# Patient Record
Sex: FEMALE | Race: WHITE | NOT HISPANIC OR LATINO | Employment: OTHER | URBAN - NONMETROPOLITAN AREA
[De-identification: names, ages, dates, MRNs, and addresses within clinical notes are randomized per-mention and may not be internally consistent; named-entity substitution may affect disease eponyms.]

---

## 2017-01-04 ENCOUNTER — TELEPHONE (OUTPATIENT)
Dept: ORTHOPEDIC SURGERY | Facility: CLINIC | Age: 52
End: 2017-01-04

## 2017-01-04 ENCOUNTER — APPOINTMENT (OUTPATIENT)
Dept: PREADMISSION TESTING | Facility: HOSPITAL | Age: 52
End: 2017-01-04

## 2017-01-04 LAB
ANION GAP SERPL CALCULATED.3IONS-SCNC: 5.6 MMOL/L (ref 3.6–11.2)
BASOPHILS # BLD AUTO: 0.03 10*3/MM3 (ref 0–0.3)
BASOPHILS NFR BLD AUTO: 0.4 % (ref 0–2)
BUN BLD-MCNC: 10 MG/DL (ref 7–21)
BUN/CREAT SERPL: 11.5 (ref 7–25)
CALCIUM SPEC-SCNC: 10.1 MG/DL (ref 7.7–10)
CHLORIDE SERPL-SCNC: 101 MMOL/L (ref 99–112)
CO2 SERPL-SCNC: 30.4 MMOL/L (ref 24.3–31.9)
CREAT BLD-MCNC: 0.87 MG/DL (ref 0.43–1.29)
DEPRECATED RDW RBC AUTO: 48.7 FL (ref 37–54)
EOSINOPHIL # BLD AUTO: 0.14 10*3/MM3 (ref 0–0.7)
EOSINOPHIL NFR BLD AUTO: 1.9 % (ref 0–5)
ERYTHROCYTE [DISTWIDTH] IN BLOOD BY AUTOMATED COUNT: 15.4 % (ref 11.5–14.5)
GFR SERPL CREATININE-BSD FRML MDRD: 68 ML/MIN/1.73
GLUCOSE BLD-MCNC: 143 MG/DL (ref 70–110)
HCT VFR BLD AUTO: 47.6 % (ref 37–47)
HGB BLD-MCNC: 15.8 G/DL (ref 12–16)
IMM GRANULOCYTES # BLD: 0.01 10*3/MM3 (ref 0–0.03)
IMM GRANULOCYTES NFR BLD: 0.1 % (ref 0–0.5)
LYMPHOCYTES # BLD AUTO: 2.24 10*3/MM3 (ref 1–3)
LYMPHOCYTES NFR BLD AUTO: 31.1 % (ref 21–51)
MCH RBC QN AUTO: 28.5 PG (ref 27–33)
MCHC RBC AUTO-ENTMCNC: 33.2 G/DL (ref 33–37)
MCV RBC AUTO: 85.9 FL (ref 80–94)
MONOCYTES # BLD AUTO: 0.93 10*3/MM3 (ref 0.1–0.9)
MONOCYTES NFR BLD AUTO: 12.9 % (ref 0–10)
NEUTROPHILS # BLD AUTO: 3.86 10*3/MM3 (ref 1.4–6.5)
NEUTROPHILS NFR BLD AUTO: 53.6 % (ref 30–70)
OSMOLALITY SERPL CALC.SUM OF ELEC: 275.3 MOSM/KG (ref 273–305)
PLATELET # BLD AUTO: 278 10*3/MM3 (ref 130–400)
PMV BLD AUTO: 11.3 FL (ref 6–10)
POTASSIUM BLD-SCNC: 4.4 MMOL/L (ref 3.5–5.3)
RBC # BLD AUTO: 5.54 10*6/MM3 (ref 4.2–5.4)
SODIUM BLD-SCNC: 137 MMOL/L (ref 135–153)
WBC NRBC COR # BLD: 7.21 10*3/MM3 (ref 4.5–12.5)

## 2017-01-04 PROCEDURE — 80048 BASIC METABOLIC PNL TOTAL CA: CPT | Performed by: ORTHOPAEDIC SURGERY

## 2017-01-04 PROCEDURE — 36415 COLL VENOUS BLD VENIPUNCTURE: CPT

## 2017-01-04 PROCEDURE — 85025 COMPLETE CBC W/AUTO DIFF WBC: CPT | Performed by: ORTHOPAEDIC SURGERY

## 2017-01-04 RX ORDER — AMOXICILLIN AND CLAVULANATE POTASSIUM 875; 125 MG/1; MG/1
1 TABLET, FILM COATED ORAL 2 TIMES DAILY
COMMUNITY
End: 2017-11-22

## 2017-01-04 RX ORDER — MAGNESIUM GLUCONATE 27 MG(500)
500 TABLET ORAL 2 TIMES DAILY
COMMUNITY
End: 2019-04-10

## 2017-01-04 RX ORDER — CETIRIZINE HYDROCHLORIDE 10 MG/1
10 TABLET ORAL NIGHTLY
COMMUNITY

## 2017-01-04 NOTE — DISCHARGE INSTRUCTIONS
0800   1/5/17  TAKE the following medications the morning of surgery:  All heart or blood pressure medications    HOLD all diabetic medications the morning of surgery as ordered by physician.      General Instructions:  · Do not eat or drink after midnight: includes water, mints, or gum. You may brush your teeth.  · Do not smoke, chew tobacco, or drink alcohol.  · Bring medications in original bottles, any inhalers and if applicable your C-PAP/BI-PAP machine.  · Bring any papers given to you in the doctor's office.  · Wear clean comfortable clothes and socks.  · Do not wear contact lenses or make-up. Bring a case for your glasses if applicable.  · Bring crutches or walker if applicable.  · Leave all other valuables and jewelry at home.    If you were given a blood bank ID arm band remember to bring it with you the day of surgery.    Preventing a Surgical Site Infection:  Shower on the morning of surgery using a fresh bar of anti-bacterial soap (such as Dial) and clean washcloth. Dry with a clean towel and dress in clean clothing.  For 2 to 3 days before surgery, avoid shaving with a razor near where you will have surgery because the razor can irritate skin and make it easier to develop an infection. Ask your surgeon if you will be receiving antibiotics prior to surgery.  Make sure you, your family, and all healthcare providers clear their hands with soap and water or an alcohol-based hand  before caring for you or your wound.  If at all possible, quit smoking as many days before surgery as you can.    Day of surgery:  Upon arrival, a Pre-op nurse and Anesthesiologist will review your health history, obtain vital signs, and answer questions you may have. The only belongings needed at this time will be your home medications and if applicable your C-PAP/BI-PAP machine. If you are staying overnight your family can leave the rest of your belongings in the car and bring them to your room later. A Pre-op nurse  will start an IV and you may receive medication in preparation for surgery, including something to help you relax. Your family will be able to see you in the Pre-op area. While you are in surgery your family should notify the waiting room  if they leave the waiting room area and provide a contact phone number.    Please be aware that surgery does come with discomfort. We want to make every effort to control your discomfort so please discuss any uncontrolled symptoms with your nurse. Your doctor will most likely have prescribed pain medications.    If you are going home after surgery you will receive individualized written care instructions before being discharged. A responsible adult must drive you to and from the hospital on the day of surgery and stay with you for 24 hours.    If you are staying overnight following surgery, you will be transported to your hospital room following the recovery period.  [facility] has all private rooms.    If you have any questions please call Pre-Admission Testing at [phone].  Deductibles and co-payments are collected on the day of service. Please be prepared to pay the required co-pay, deductible or deposit on the day of service as defined by your plan.

## 2017-01-04 NOTE — MR AVS SNAPSHOT
Debbieida Strauss   1/4/2017 12:45 PM   Appointment    Provider:  PAT 3 [facility]   Department:  [facility]   Dept Phone:  [phone]                Your Full Care Plan              Your Updated Medication List          This list is accurate as of: 1/4/17  1:07 PM.  Always use your most recent med list.                albuterol 108 (90 BASE) MCG/ACT inhaler   Commonly known as:  PROVENTIL HFA;VENTOLIN HFA       amitriptyline 10 MG tablet   Commonly known as:  ELAVIL       amoxicillin-clavulanate 875-125 MG per tablet   Commonly known as:  AUGMENTIN       aspirin 81 MG tablet       atorvastatin 20 MG tablet   Commonly known as:  LIPITOR       baclofen 20 MG tablet   Commonly known as:  LIORESAL       cetirizine 10 MG tablet   Commonly known as:  zyrTEC       clotrimazole-betamethasone 1-0.05 % cream   Commonly known as:  LOTRISONE       lisinopril 5 MG tablet   Commonly known as:  PRINIVIL,ZESTRIL       magnesium gluconate 500 MG tablet   Commonly known as:  MAGONATE       metFORMIN 850 MG tablet   Commonly known as:  GLUCOPHAGE       metoprolol tartrate 25 MG tablet   Commonly known as:  LOPRESSOR       montelukast 10 MG tablet   Commonly known as:  SINGULAIR       nabumetone 750 MG tablet   Commonly known as:  RELAFEN       SITagliptin 50 MG tablet   Commonly known as:  JANUVIA       vitamin D 94153 UNITS capsule capsule   Commonly known as:  ERGOCALCIFEROL               MyChart Signup     Our records indicate that your [facility] [email or web address] account has been deactivated. If you would like to reactivate your account, please email [email or web address] or call [phone] to talk to our [email or web address] staff.             Other Info from Your Visit           Allergies     Corticosteroids       Vital Signs     Smoking Status                   Current Every Day Smoker             Discharge Instructions       0800   1/5/17  TAKE the following medications the morning  of surgery:  All heart or blood pressure medications    HOLD all diabetic medications the morning of surgery as ordered by physician.      General Instructions:  · Do not eat or drink after midnight: includes water, mints, or gum. You may brush your teeth.  · Do not smoke, chew tobacco, or drink alcohol.  · Bring medications in original bottles, any inhalers and if applicable your C-PAP/BI-PAP machine.  · Bring any papers given to you in the doctor's office.  · Wear clean comfortable clothes and socks.  · Do not wear contact lenses or make-up. Bring a case for your glasses if applicable.  · Bring crutches or walker if applicable.  · Leave all other valuables and jewelry at home.    If you were given a blood bank ID arm band remember to bring it with you the day of surgery.    Preventing a Surgical Site Infection:  Shower on the morning of surgery using a fresh bar of anti-bacterial soap (such as Dial) and clean washcloth. Dry with a clean towel and dress in clean clothing.  For 2 to 3 days before surgery, avoid shaving with a razor near where you will have surgery because the razor can irritate skin and make it easier to develop an infection. Ask your surgeon if you will be receiving antibiotics prior to surgery.  Make sure you, your family, and all healthcare providers clear their hands with soap and water or an alcohol-based hand  before caring for you or your wound.  If at all possible, quit smoking as many days before surgery as you can.    Day of surgery:  Upon arrival, a Pre-op nurse and Anesthesiologist will review your health history, obtain vital signs, and answer questions you may have. The only belongings needed at this time will be your home medications and if applicable your C-PAP/BI-PAP machine. If you are staying overnight your family can leave the rest of your belongings in the car and bring them to your room later. A Pre-op nurse will start an IV and you may receive medication in  preparation for surgery, including something to help you relax. Your family will be able to see you in the Pre-op area. While you are in surgery your family should notify the waiting room  if they leave the waiting room area and provide a contact phone number.    Please be aware that surgery does come with discomfort. We want to make every effort to control your discomfort so please discuss any uncontrolled symptoms with your nurse. Your doctor will most likely have prescribed pain medications.    If you are going home after surgery you will receive individualized written care instructions before being discharged. A responsible adult must drive you to and from the hospital on the day of surgery and stay with you for 24 hours.    If you are staying overnight following surgery, you will be transported to your hospital room following the recovery period.  [facility] has all private rooms.    If you have any questions please call Pre-Admission Testing at [phone].  Deductibles and co-payments are collected on the day of service. Please be prepared to pay the required co-pay, deductible or deposit on the day of service as defined by your plan.           SYMPTOMS OF A STROKE    Call 911 or have someone take you to the Emergency Department if you have any of the following:    · Sudden numbness or weakness of your face, arm or leg especially on one side of the body  · Sudden confusion, diffiiculty speaking or trouble understanding   · Changes in your vision or loss of sight in one eye  · Sudden severe headache with no known cause  · sudden dizziness, trouble walking, loss of balance or coordination    It is important to seek emergency care right away if you have further stroke symptoms. If you get emergency help quickly, the powerful clot-dissolving medicines can reduce the disabilities caused by a stroke.     For more information:    American Stroke  Association  6-288-5-STROKE  www.strokeassociation.org           IF YOU SMOKE OR USE TOBACCO PLEASE READ THE FOLLOWING:    Why is smoking bad for me?  Smoking increases the risk of heart disease, lung disease, vascular disease, stroke, and cancer.     If you smoke, STOP!    If you would like more information on quitting smoking, please visit the [employer] website: [email or web address]   This link will provide additional resources including the QUIT line and the Beat the Pack support groups.     For more information:    American Cancer Society  ([phone] 242-8470    American Heart Association  1-476.124.3903

## 2017-01-05 ENCOUNTER — ANESTHESIA EVENT (OUTPATIENT)
Dept: PERIOP | Facility: HOSPITAL | Age: 52
End: 2017-01-05

## 2017-01-05 ENCOUNTER — ANESTHESIA (OUTPATIENT)
Dept: PERIOP | Facility: HOSPITAL | Age: 52
End: 2017-01-05

## 2017-01-05 PROCEDURE — 25010000002 PROPOFOL 1000 MG/ML EMULSION: Performed by: NURSE ANESTHETIST, CERTIFIED REGISTERED

## 2017-01-05 PROCEDURE — 25010000002 MIDAZOLAM PER 1 MG: Performed by: NURSE ANESTHETIST, CERTIFIED REGISTERED

## 2017-01-05 PROCEDURE — 25010000002 FENTANYL CITRATE (PF) 100 MCG/2ML SOLUTION: Performed by: NURSE ANESTHETIST, CERTIFIED REGISTERED

## 2017-01-05 RX ORDER — LIDOCAINE HYDROCHLORIDE 20 MG/ML
INJECTION, SOLUTION INFILTRATION; PERINEURAL AS NEEDED
Status: DISCONTINUED | OUTPATIENT
Start: 2017-01-05 | End: 2017-01-05 | Stop reason: SURG

## 2017-01-05 RX ORDER — FAMOTIDINE 10 MG/ML
INJECTION, SOLUTION INTRAVENOUS AS NEEDED
Status: DISCONTINUED | OUTPATIENT
Start: 2017-01-05 | End: 2017-01-05 | Stop reason: SURG

## 2017-01-05 RX ORDER — FENTANYL CITRATE 50 UG/ML
INJECTION, SOLUTION INTRAMUSCULAR; INTRAVENOUS AS NEEDED
Status: DISCONTINUED | OUTPATIENT
Start: 2017-01-05 | End: 2017-01-05 | Stop reason: SURG

## 2017-01-05 RX ORDER — MIDAZOLAM HYDROCHLORIDE 1 MG/ML
INJECTION INTRAMUSCULAR; INTRAVENOUS AS NEEDED
Status: DISCONTINUED | OUTPATIENT
Start: 2017-01-05 | End: 2017-01-05 | Stop reason: SURG

## 2017-01-05 RX ADMIN — SODIUM CHLORIDE, POTASSIUM CHLORIDE, SODIUM LACTATE AND CALCIUM CHLORIDE: 600; 310; 30; 20 INJECTION, SOLUTION INTRAVENOUS at 10:12

## 2017-01-05 RX ADMIN — FAMOTIDINE 20 MG: 10 INJECTION, SOLUTION INTRAVENOUS at 09:14

## 2017-01-05 RX ADMIN — FENTANYL CITRATE 100 MCG: 50 INJECTION INTRAMUSCULAR; INTRAVENOUS at 09:14

## 2017-01-05 RX ADMIN — SODIUM CHLORIDE, POTASSIUM CHLORIDE, SODIUM LACTATE AND CALCIUM CHLORIDE: 600; 310; 30; 20 INJECTION, SOLUTION INTRAVENOUS at 09:14

## 2017-01-05 RX ADMIN — LIDOCAINE HYDROCHLORIDE 40 MG: 20 INJECTION, SOLUTION INFILTRATION; PERINEURAL at 09:15

## 2017-01-05 RX ADMIN — MIDAZOLAM HYDROCHLORIDE 2 MG: 1 INJECTION, SOLUTION INTRAMUSCULAR; INTRAVENOUS at 09:14

## 2017-01-05 RX ADMIN — PROPOFOL 100 MCG/KG/MIN: 10 INJECTION, EMULSION INTRAVENOUS at 09:16

## 2017-01-05 NOTE — ANESTHESIA POSTPROCEDURE EVALUATION
Patient: Debbie Strauss    Procedure Summary     Date Anesthesia Start Anesthesia Stop Room / Location    01/05/17 0914 1025  [facility] OR 03 / [facility] OR       Procedure Diagnosis Surgeon Provider    ULNAR NERVE RELEASE, CARPAL TUNNEL RELEASE (Left Arm Lower) Carpal tunnel syndrome of left wrist; Ulnar neuropathy at elbow of left upper extremity  (Carpal tunnel syndrome of left wrist [G56.02]; Ulnar neuropathy at elbow of left upper extremity [G56.22]) MD Bartolo Gill MD          Anesthesia Type: general  Last vitals  /68 (01/05/17 1037)    Temp 97.6 °F (36.4 °C) (01/05/17 1022)    Pulse 72 (01/05/17 1037)   Resp 14 (01/05/17 1037)    SpO2 98 % (01/05/17 1037)      Post Anesthesia Care and Evaluation    Patient location during evaluation: PHASE II  Patient participation: complete - patient participated  Level of consciousness: awake and alert  Pain score: 1  Pain management: adequate  Airway patency: patent  Anesthetic complications: No PONV, no malignant hyperthermia, no difficult airway, no acute intermittent porphyria, no failed pre-medication, and no pseudocholinesterase deficiency.  Cardiovascular status: acceptable  Respiratory status: acceptable  Hydration status: acceptable

## 2017-01-05 NOTE — ANESTHESIA PREPROCEDURE EVALUATION
Anesthesia Evaluation     Patient summary reviewed and Nursing notes reviewed    History of anesthetic complications (trouble waking up)   Airway   Mallampati: II  TM distance: >3 FB  Neck ROM: full  no difficulty expected  Dental - normal exam     Pulmonary - normal exam   (+) hx of smoking, COPD, asthma,   Cardiovascular - normal exam  Exercise tolerance: good (4-7 METS)  (+) hypertension, CAD,   (-) past MI, dysrhythmias, angina, CHF    NYHA Classification: II  Patient on routine beta blocker and Beta blocker given within 24 hours of surgery    Neuro/Psych  (+) CVA (left sided weakness) residual symptoms, headaches, numbness, psychiatric history,    (-) seizures  GI/Hepatic/Renal/Endo    (+)  GERD, diabetes mellitus,   (-) hypothyroidism    Musculoskeletal     (+) myalgias,   Abdominal  - normal exam    Bowel sounds: normal.   Substance History - negative use     OB/GYN negative ob/gyn ROS         Other   (+) arthritis                          Anesthesia Plan    ASA 3     general     intravenous induction   Anesthetic plan and risks discussed with patient.  Use of blood products discussed with patient  Consented to blood products.

## 2017-01-06 ENCOUNTER — TELEPHONE (OUTPATIENT)
Dept: ORTHOPEDIC SURGERY | Facility: CLINIC | Age: 52
End: 2017-01-06

## 2017-01-06 NOTE — TELEPHONE ENCOUNTER
Patient call with some questions concerning her surgery CTR and Ulnar nerve release 01/05/17.   Can she take a shower, when can the dressing be removed and does she need to be in the sling.   Patient was instructed the surgery site can not get wet and the dressing needs to stay on until her post op visit and she is to stay in the sling. Patient stated she understood.

## 2017-01-16 ENCOUNTER — OFFICE VISIT (OUTPATIENT)
Dept: ORTHOPEDIC SURGERY | Facility: CLINIC | Age: 52
End: 2017-01-16

## 2017-01-16 DIAGNOSIS — G56.22 ULNAR NEUROPATHY AT ELBOW OF LEFT UPPER EXTREMITY: ICD-10-CM

## 2017-01-16 DIAGNOSIS — Z98.890 S/P CARPAL TUNNEL RELEASE: ICD-10-CM

## 2017-01-16 DIAGNOSIS — G56.02 CARPAL TUNNEL SYNDROME OF LEFT WRIST: Primary | ICD-10-CM

## 2017-01-16 PROCEDURE — 99024 POSTOP FOLLOW-UP VISIT: CPT | Performed by: ORTHOPAEDIC SURGERY

## 2017-01-16 NOTE — PROGRESS NOTES
Debbie Strauss   1965 01/16/2017  Date of Surgery: January 5,2017.    HPI: Follow-up status post left Carpal Tunnel release, Cubital Tunnel Release. . Patient returns to clinic today now 11 days out from surgery.  Doing well with wrist, elbow pain controlled. Mild soreness and numbness of the left 4th and 5th digits. Denies any other complications or paraesthesias.      Physical Exam: 51 y.o. female .  General Appearance:    Well appearing, cooperative, in no acute distress.       Patient is alert and oriented x 3.     There were no vitals filed for this visit.    left wrist,elbow incisions clean, dry, intact, healing well. no erythema, ecchymosis or significant effusion   Flexs and extends wrist and elbow with no complication.     Brisk capillary refill.   No tenderness upon palpation   Neurovascular status grossly intact               Impression       ICD-10-CM ICD-9-CM   1. Carpal tunnel syndrome of left wrist G56.02 354.0   2. Ulnar neuropathy at elbow of left upper extremity G56.22 354.2   3. S/P carpal tunnel release Z98.890 V45.89      Status post  left Carpal Tunnel release, Cubital Tunnel Release.     11 days post op.     Plan   1.Progress activity with the exception of heavy lifting over the next 6 weeks. Discontinue sling and splint.   2. Removal of Sutures left hand, staples left elbow.   3. Return to clinic in  6 weeks    Discussion/Summary:    Written by Kvng Segal PA-C, acting as scribe for Dr. Tiarra GREGORIO, Dr. Mayen personally performed the services described in this documentation, as scribed by Kvng Segal PA-C, in my presence, and is both accurate and complete.    This document was signed by Kvng Segal PA-C January 16, 2017 1:16 PM

## 2017-01-16 NOTE — MR AVS SNAPSHOT
Debbie Strauss   1/16/2017 2:30 PM   Office Visit    Dept Phone:  [phone]   Encounter #:  [id number]    Provider:  Wilmer Mayen MD   Department:  [facility]                Your Full Care Plan              Your Updated Medication List          This list is accurate as of: 1/16/17  1:38 PM.  Always use your most recent med list.                albuterol 108 (90 BASE) MCG/ACT inhaler   Commonly known as:  PROVENTIL HFA;VENTOLIN HFA       amitriptyline 10 MG tablet   Commonly known as:  ELAVIL       amoxicillin-clavulanate 875-125 MG per tablet   Commonly known as:  AUGMENTIN       aspirin 81 MG tablet       atorvastatin 20 MG tablet   Commonly known as:  LIPITOR       baclofen 20 MG tablet   Commonly known as:  LIORESAL       cetirizine 10 MG tablet   Commonly known as:  zyrTEC       clotrimazole-betamethasone 1-0.05 % cream   Commonly known as:  LOTRISONE       lisinopril 5 MG tablet   Commonly known as:  PRINIVIL,ZESTRIL       magnesium gluconate 500 MG tablet   Commonly known as:  MAGONATE       metFORMIN 850 MG tablet   Commonly known as:  GLUCOPHAGE       metoprolol tartrate 25 MG tablet   Commonly known as:  LOPRESSOR       montelukast 10 MG tablet   Commonly known as:  SINGULAIR       nabumetone 750 MG tablet   Commonly known as:  RELAFEN       oxyCODONE-acetaminophen 5-325 MG per tablet   Commonly known as:  PERCOCET   Take 1-2 tablets by mouth Every 4 (Four) Hours As Needed (Pain).       SITagliptin 50 MG tablet   Commonly known as:  JANUVIA       vitamin D 31296 UNITS capsule capsule   Commonly known as:  ERGOCALCIFEROL               You Were Diagnosed With        Codes Comments    Carpal tunnel syndrome of left wrist    -  Primary ICD-10-CM: G56.02  ICD-9-CM: 354.0     Ulnar neuropathy at elbow of left upper extremity     ICD-10-CM: G56.22  ICD-9-CM: 354.2     S/P carpal tunnel release     ICD-10-CM: Z98.890  ICD-9-CM: V45.89          Instructions     None    Patient Instructions History      Upcoming Appointments     Visit Type Date Time Department    POST-OP 1/16/2017  2:30 PM [facility]    FOLLOW UP 2/27/2017 10:10 AM [facility]      MyChart Signup     Our records indicate that you have declined [facility] MyChart signup. If you would like to sign up for MyChart, please email [email or web address] or call [phone] to obtain an activation code.             Other Info from Your Visit           Your Appointments     Jan 16, 2017  2:30 PM EST   Post-Op with Wilmer Mayen MD   [facility] (--)    [address]  [place] [address]   [phone]            Feb 27, 2017 10:10 AM EST   Follow Up with Wilmer Mayen MD   [facility] (--)    [address]  [place] [address]   [phone]           Arrive 15 minutes prior to appointment.              Allergies     Corticosteroids      Prednisone Intolerance Other (See Comments)      Reason for Visit     Left Hand - Post-op     Joint Swelling           Vital Signs     Smoking Status                   Current Every Day Smoker           Problems and Diagnoses Noted     Carpal tunnel syndrome on left    Ulnar neuropathy at elbow of left upper extremity    Status post carpal tunnel release

## 2017-02-27 ENCOUNTER — OFFICE VISIT (OUTPATIENT)
Dept: ORTHOPEDIC SURGERY | Facility: CLINIC | Age: 52
End: 2017-02-27

## 2017-02-27 VITALS — BODY MASS INDEX: 41.46 KG/M2 | HEIGHT: 63 IN | WEIGHT: 234 LBS

## 2017-02-27 DIAGNOSIS — G56.22 ULNAR NEUROPATHY AT ELBOW OF LEFT UPPER EXTREMITY: ICD-10-CM

## 2017-02-27 DIAGNOSIS — G56.02 CARPAL TUNNEL SYNDROME OF LEFT WRIST: Primary | ICD-10-CM

## 2017-02-27 PROCEDURE — 99024 POSTOP FOLLOW-UP VISIT: CPT | Performed by: ORTHOPAEDIC SURGERY

## 2017-02-27 NOTE — PROGRESS NOTES
Debbie Strauss   :1965    Date of encounter:2017        HPI:  Debbie Strauss is a 51 y.o. seen here today status post carpal tunnel release and ulnar nerve release of the left arm.  She is now approximately 7 weeks postop.  She states she's been doing well and only has some mild pain along the incision in her hand.  She also has some mild numbness just on the tip of the third and fifth fingers.      Exam:  On examination both incisions are healed nicely.  She has normal motion.  She has improved sensation with just mild numbness along the third and fifth finger.      Assessment  No diagnosis found.    Plan:  A 51-year-old female 7 weeks status post carpal tunnel release of the left wrist and ulnar nerve release of the left elbow.  She's doing well with improvement in her paresthesias.  We've advised she can slowly ease back into activities as tolerated and will return back here on an as-needed basis with any further difficulties.    Written by, Meghan AMADOR, acting as a scribe for Dr. Tiarra Gómez I, Wilmer Mayen MD, personally performed the services described in this documentation as scribed by the above named individual in my presence, and it is both accurate and complete.  2017  1:49 PM

## 2017-11-22 ENCOUNTER — OFFICE VISIT (OUTPATIENT)
Dept: ORTHOPEDIC SURGERY | Facility: CLINIC | Age: 52
End: 2017-11-22

## 2017-11-22 VITALS — BODY MASS INDEX: 41.46 KG/M2 | HEIGHT: 63 IN | WEIGHT: 234 LBS

## 2017-11-22 DIAGNOSIS — G56.21 ULNAR NEUROPATHY OF RIGHT UPPER EXTREMITY: Primary | ICD-10-CM

## 2017-11-22 PROCEDURE — 99213 OFFICE O/P EST LOW 20 MIN: CPT | Performed by: ORTHOPAEDIC SURGERY

## 2017-11-22 RX ORDER — SITAGLIPTIN 100 MG/1
100 TABLET, FILM COATED ORAL DAILY
COMMUNITY
Start: 2017-11-08

## 2017-11-22 RX ORDER — PIOGLITAZONEHYDROCHLORIDE 30 MG/1
30 TABLET ORAL DAILY
COMMUNITY
Start: 2017-10-25

## 2017-11-22 RX ORDER — CHLORTHALIDONE 25 MG/1
12.5 TABLET ORAL DAILY
COMMUNITY
Start: 2017-11-08

## 2017-11-22 NOTE — PROGRESS NOTES
"Debbie Strauss   :1965    Date of encounter:2017        HPI:  Debbie Strauss is a 52 y.o. female who presents here today with new complaints of right hand pain.  She complains of numbness and tingling in the fourth and fifth digits.  She states this is been ongoing for approximately 3 months.  She states the numbness is now more or less constant.  She's also we'll have pain in her elbow that'll radiate down to the fingers.  She has undergone EMG and nerve conduction study.  She also has a history of ulnar nerve release and carpal tunnel release on the left wrist done approximately a year ago and has had  complete resolution of her symptoms.    PMH:   Patient Active Problem List   Diagnosis   • Diabetes   • Hypertension   • High cholesterol   • Hardening of the arteries of the heart   • Diverticula of colon   • Asthma   • Pain of right hand   • Carpal tunnel syndrome of left wrist   • Ulnar neuropathy at elbow of left upper extremity       Exam:  General Appearance:    52 y.o. female  cooperative, in no acute distress.  Alert and oriented x 3,                   Vitals:    17 1044   Weight: 234 lb (106 kg)   Height: 63\" (160 cm)       Examination of the right hand reveals no swelling or ecchymosis.  She has decreased since fixation along the fourth and fifth fingers.  She is weakness with finger at reduction.  She has moderately positive Tinel sign at the elbow.  She has full range of motion.    Procedure:   EMG and nerve conduction study were reviewed today revealing moderate ulnar neuropathy across the right elbow    Assessment    ICD-10-CM ICD-9-CM   1. Ulnar neuropathy of right upper extremity G56.21 354.2       Plan:     A 52-year-old female with clinical evidence of ulnar nerve neuropathy with paresthesias within the fourth and fifth fingers.  EMG and nerve conduction study was reviewed today revealing moderate ulnar nerve neuropathy across the right elbow.  Given this with the severity " of her symptoms we have discussed proceeding with surgical intervention including ulnar nerve release.  We discussed the risks, benefits, and future outcomes of surgery.  She accepts the risks and is agreeable to surgery.  She will need clearance prior to surgery and once this has been obtained she'll return back and we will discuss scheduling.    Written by, Meghan AMADOR, acting as a scribe for Dr. Tiarra Gómez                   Review of Systems:   Review of Systems   Constitutional: Negative.    HENT: Negative.    Eyes: Negative.    Respiratory: Negative.    Cardiovascular: Negative.    Gastrointestinal: Negative.    Genitourinary: Negative.    Skin: Negative.    Neurological: Negative.    Hematological: Negative.    Psychiatric/Behavioral: Negative.

## 2019-04-09 ENCOUNTER — HOSPITAL ENCOUNTER (OUTPATIENT)
Facility: HOSPITAL | Age: 54
Setting detail: OBSERVATION
Discharge: HOME | End: 2019-04-12
Attending: FAMILY MEDICINE | Admitting: INTERNAL MEDICINE

## 2019-04-09 DIAGNOSIS — L03.011 CELLULITIS OF FINGER OF RIGHT HAND: Primary | ICD-10-CM

## 2019-04-09 PROCEDURE — 99283 EMERGENCY DEPT VISIT LOW MDM: CPT

## 2019-04-09 RX ORDER — SODIUM CHLORIDE 0.9 % (FLUSH) 0.9 %
10 SYRINGE (ML) INJECTION AS NEEDED
Status: DISCONTINUED | OUTPATIENT
Start: 2019-04-09 | End: 2019-04-12 | Stop reason: HOSPADM

## 2019-04-10 ENCOUNTER — APPOINTMENT (OUTPATIENT)
Dept: GENERAL RADIOLOGY | Facility: HOSPITAL | Age: 54
End: 2019-04-10

## 2019-04-10 PROBLEM — L03.011 CELLULITIS OF FINGER OF RIGHT HAND: Status: ACTIVE | Noted: 2019-04-10

## 2019-04-10 LAB
ALBUMIN SERPL-MCNC: 3.7 G/DL (ref 3.5–5.2)
ALBUMIN/GLOB SERPL: 0.9 G/DL
ALP SERPL-CCNC: 60 U/L (ref 39–117)
ALT SERPL W P-5'-P-CCNC: 12 U/L (ref 1–33)
ANION GAP SERPL CALCULATED.3IONS-SCNC: 13.2 MMOL/L
ANION GAP SERPL CALCULATED.3IONS-SCNC: 13.9 MMOL/L
AST SERPL-CCNC: 18 U/L (ref 1–32)
BASOPHILS # BLD AUTO: 0.02 10*3/MM3 (ref 0–0.2)
BASOPHILS # BLD AUTO: 0.03 10*3/MM3 (ref 0–0.2)
BASOPHILS NFR BLD AUTO: 0.3 % (ref 0–1.5)
BASOPHILS NFR BLD AUTO: 0.5 % (ref 0–1.5)
BILIRUB SERPL-MCNC: 0.2 MG/DL (ref 0.2–1.2)
BUN BLD-MCNC: 21 MG/DL (ref 6–20)
BUN BLD-MCNC: 22 MG/DL (ref 6–20)
BUN/CREAT SERPL: 19 (ref 7–25)
BUN/CREAT SERPL: 20.4 (ref 7–25)
CALCIUM SPEC-SCNC: 8.7 MG/DL (ref 8.6–10.5)
CALCIUM SPEC-SCNC: 9.4 MG/DL (ref 8.6–10.5)
CHLORIDE SERPL-SCNC: 102 MMOL/L (ref 98–107)
CHLORIDE SERPL-SCNC: 98 MMOL/L (ref 98–107)
CO2 SERPL-SCNC: 20.8 MMOL/L (ref 22–29)
CO2 SERPL-SCNC: 24.1 MMOL/L (ref 22–29)
CREAT BLD-MCNC: 1.03 MG/DL (ref 0.57–1)
CREAT BLD-MCNC: 1.16 MG/DL (ref 0.57–1)
CRP SERPL-MCNC: 1.31 MG/DL (ref 0–0.5)
CRP SERPL-MCNC: 1.54 MG/DL (ref 0–0.5)
D-LACTATE SERPL-SCNC: 1 MMOL/L (ref 0.5–2)
DEPRECATED RDW RBC AUTO: 50.9 FL (ref 37–54)
DEPRECATED RDW RBC AUTO: 51.3 FL (ref 37–54)
EOSINOPHIL # BLD AUTO: 0.05 10*3/MM3 (ref 0–0.4)
EOSINOPHIL # BLD AUTO: 0.08 10*3/MM3 (ref 0–0.4)
EOSINOPHIL NFR BLD AUTO: 0.8 % (ref 0.3–6.2)
EOSINOPHIL NFR BLD AUTO: 1.3 % (ref 0.3–6.2)
ERYTHROCYTE [DISTWIDTH] IN BLOOD BY AUTOMATED COUNT: 16.3 % (ref 12.3–15.4)
ERYTHROCYTE [DISTWIDTH] IN BLOOD BY AUTOMATED COUNT: 16.4 % (ref 12.3–15.4)
ERYTHROCYTE [SEDIMENTATION RATE] IN BLOOD: 40 MM/HR (ref 0–30)
GFR SERPL CREATININE-BSD FRML MDRD: 49 ML/MIN/1.73
GFR SERPL CREATININE-BSD FRML MDRD: 56 ML/MIN/1.73
GLOBULIN UR ELPH-MCNC: 3.9 GM/DL
GLUCOSE BLD-MCNC: 104 MG/DL (ref 65–99)
GLUCOSE BLD-MCNC: 142 MG/DL (ref 65–99)
GLUCOSE BLDC GLUCOMTR-MCNC: 104 MG/DL (ref 70–130)
GLUCOSE BLDC GLUCOMTR-MCNC: 112 MG/DL (ref 70–130)
GLUCOSE BLDC GLUCOMTR-MCNC: 128 MG/DL (ref 70–130)
GLUCOSE BLDC GLUCOMTR-MCNC: 94 MG/DL (ref 70–130)
HCT VFR BLD AUTO: 37.4 % (ref 34–46.6)
HCT VFR BLD AUTO: 40.7 % (ref 34–46.6)
HGB BLD-MCNC: 12.2 G/DL (ref 12–15.9)
HGB BLD-MCNC: 13.4 G/DL (ref 12–15.9)
IMM GRANULOCYTES # BLD AUTO: 0.01 10*3/MM3 (ref 0–0.05)
IMM GRANULOCYTES # BLD AUTO: 0.02 10*3/MM3 (ref 0–0.05)
IMM GRANULOCYTES NFR BLD AUTO: 0.2 % (ref 0–0.5)
IMM GRANULOCYTES NFR BLD AUTO: 0.3 % (ref 0–0.5)
LYMPHOCYTES # BLD AUTO: 1.37 10*3/MM3 (ref 0.7–3.1)
LYMPHOCYTES # BLD AUTO: 1.65 10*3/MM3 (ref 0.7–3.1)
LYMPHOCYTES NFR BLD AUTO: 23.1 % (ref 19.6–45.3)
LYMPHOCYTES NFR BLD AUTO: 26.5 % (ref 19.6–45.3)
MAGNESIUM SERPL-MCNC: 1.9 MG/DL (ref 1.6–2.6)
MCH RBC QN AUTO: 28.1 PG (ref 26.6–33)
MCH RBC QN AUTO: 28.3 PG (ref 26.6–33)
MCHC RBC AUTO-ENTMCNC: 32.6 G/DL (ref 31.5–35.7)
MCHC RBC AUTO-ENTMCNC: 32.9 G/DL (ref 31.5–35.7)
MCV RBC AUTO: 85.3 FL (ref 79–97)
MCV RBC AUTO: 86.8 FL (ref 79–97)
MONOCYTES # BLD AUTO: 0.91 10*3/MM3 (ref 0.1–0.9)
MONOCYTES # BLD AUTO: 1.01 10*3/MM3 (ref 0.1–0.9)
MONOCYTES NFR BLD AUTO: 15.3 % (ref 5–12)
MONOCYTES NFR BLD AUTO: 16.2 % (ref 5–12)
NEUTROPHILS # BLD AUTO: 3.43 10*3/MM3 (ref 1.4–7)
NEUTROPHILS # BLD AUTO: 3.58 10*3/MM3 (ref 1.4–7)
NEUTROPHILS NFR BLD AUTO: 55.2 % (ref 42.7–76)
NEUTROPHILS NFR BLD AUTO: 60.3 % (ref 42.7–76)
PLATELET # BLD AUTO: 265 10*3/MM3 (ref 140–450)
PLATELET # BLD AUTO: 304 10*3/MM3 (ref 140–450)
PMV BLD AUTO: 10.3 FL (ref 6–12)
PMV BLD AUTO: 10.4 FL (ref 6–12)
POTASSIUM BLD-SCNC: 4.1 MMOL/L (ref 3.5–5.2)
POTASSIUM BLD-SCNC: 4.2 MMOL/L (ref 3.5–5.2)
PROT SERPL-MCNC: 7.6 G/DL (ref 6–8.5)
RBC # BLD AUTO: 4.31 10*6/MM3 (ref 3.77–5.28)
RBC # BLD AUTO: 4.77 10*6/MM3 (ref 3.77–5.28)
SODIUM BLD-SCNC: 136 MMOL/L (ref 136–145)
SODIUM BLD-SCNC: 136 MMOL/L (ref 136–145)
WBC NRBC COR # BLD: 5.94 10*3/MM3 (ref 3.4–10.8)
WBC NRBC COR # BLD: 6.22 10*3/MM3 (ref 3.4–10.8)

## 2019-04-10 PROCEDURE — 94799 UNLISTED PULMONARY SVC/PX: CPT

## 2019-04-10 PROCEDURE — 82962 GLUCOSE BLOOD TEST: CPT

## 2019-04-10 PROCEDURE — 25010000002 VANCOMYCIN 5 G RECONSTITUTED SOLUTION 5,000 MG VIAL

## 2019-04-10 PROCEDURE — 85652 RBC SED RATE AUTOMATED: CPT | Performed by: PHYSICIAN ASSISTANT

## 2019-04-10 PROCEDURE — G0378 HOSPITAL OBSERVATION PER HR: HCPCS

## 2019-04-10 PROCEDURE — 85025 COMPLETE CBC W/AUTO DIFF WBC: CPT | Performed by: PHYSICIAN ASSISTANT

## 2019-04-10 PROCEDURE — 83605 ASSAY OF LACTIC ACID: CPT | Performed by: PHYSICIAN ASSISTANT

## 2019-04-10 PROCEDURE — 96366 THER/PROPH/DIAG IV INF ADDON: CPT

## 2019-04-10 PROCEDURE — 86140 C-REACTIVE PROTEIN: CPT | Performed by: NURSE PRACTITIONER

## 2019-04-10 PROCEDURE — 96365 THER/PROPH/DIAG IV INF INIT: CPT

## 2019-04-10 PROCEDURE — 73120 X-RAY EXAM OF HAND: CPT

## 2019-04-10 PROCEDURE — 36415 COLL VENOUS BLD VENIPUNCTURE: CPT

## 2019-04-10 PROCEDURE — 25010000002 VANCOMYCIN 5 G RECONSTITUTED SOLUTION 5,000 MG VIAL: Performed by: PHYSICIAN ASSISTANT

## 2019-04-10 PROCEDURE — 83735 ASSAY OF MAGNESIUM: CPT | Performed by: NURSE PRACTITIONER

## 2019-04-10 PROCEDURE — 85025 COMPLETE CBC W/AUTO DIFF WBC: CPT | Performed by: NURSE PRACTITIONER

## 2019-04-10 PROCEDURE — 80053 COMPREHEN METABOLIC PANEL: CPT | Performed by: PHYSICIAN ASSISTANT

## 2019-04-10 PROCEDURE — 73120 X-RAY EXAM OF HAND: CPT | Performed by: RADIOLOGY

## 2019-04-10 PROCEDURE — 87040 BLOOD CULTURE FOR BACTERIA: CPT | Performed by: PHYSICIAN ASSISTANT

## 2019-04-10 PROCEDURE — 86140 C-REACTIVE PROTEIN: CPT | Performed by: PHYSICIAN ASSISTANT

## 2019-04-10 RX ORDER — SODIUM CHLORIDE 0.9 % (FLUSH) 0.9 %
3 SYRINGE (ML) INJECTION EVERY 12 HOURS SCHEDULED
Status: DISCONTINUED | OUTPATIENT
Start: 2019-04-10 | End: 2019-04-12 | Stop reason: HOSPADM

## 2019-04-10 RX ORDER — DEXTROSE MONOHYDRATE 25 G/50ML
25 INJECTION, SOLUTION INTRAVENOUS
Status: DISCONTINUED | OUTPATIENT
Start: 2019-04-10 | End: 2019-04-12 | Stop reason: HOSPADM

## 2019-04-10 RX ORDER — CETIRIZINE HYDROCHLORIDE 10 MG/1
10 TABLET ORAL NIGHTLY
Status: DISCONTINUED | OUTPATIENT
Start: 2019-04-10 | End: 2019-04-12 | Stop reason: HOSPADM

## 2019-04-10 RX ORDER — MULTIVITAMIN
1 TABLET ORAL DAILY
COMMUNITY

## 2019-04-10 RX ORDER — ONDANSETRON 2 MG/ML
4 INJECTION INTRAMUSCULAR; INTRAVENOUS EVERY 6 HOURS PRN
Status: DISCONTINUED | OUTPATIENT
Start: 2019-04-10 | End: 2019-04-12 | Stop reason: HOSPADM

## 2019-04-10 RX ORDER — CYCLOBENZAPRINE HCL 10 MG
10 TABLET ORAL 3 TIMES DAILY PRN
COMMUNITY

## 2019-04-10 RX ORDER — ASPIRIN 81 MG/1
81 TABLET ORAL DAILY
Status: DISCONTINUED | OUTPATIENT
Start: 2019-04-10 | End: 2019-04-12 | Stop reason: HOSPADM

## 2019-04-10 RX ORDER — NITROGLYCERIN 0.4 MG/1
0.4 TABLET SUBLINGUAL
Status: DISCONTINUED | OUTPATIENT
Start: 2019-04-10 | End: 2019-04-12 | Stop reason: HOSPADM

## 2019-04-10 RX ORDER — ALBUTEROL SULFATE 2.5 MG/3ML
2.5 SOLUTION RESPIRATORY (INHALATION) EVERY 4 HOURS PRN
Status: DISCONTINUED | OUTPATIENT
Start: 2019-04-10 | End: 2019-04-12 | Stop reason: HOSPADM

## 2019-04-10 RX ORDER — SODIUM CHLORIDE 0.9 % (FLUSH) 0.9 %
3-10 SYRINGE (ML) INJECTION AS NEEDED
Status: DISCONTINUED | OUTPATIENT
Start: 2019-04-10 | End: 2019-04-12 | Stop reason: HOSPADM

## 2019-04-10 RX ORDER — AMITRIPTYLINE HYDROCHLORIDE 25 MG/1
25 TABLET, FILM COATED ORAL NIGHTLY
Status: DISCONTINUED | OUTPATIENT
Start: 2019-04-10 | End: 2019-04-12 | Stop reason: HOSPADM

## 2019-04-10 RX ORDER — ALUMINA, MAGNESIA, AND SIMETHICONE 2400; 2400; 240 MG/30ML; MG/30ML; MG/30ML
15 SUSPENSION ORAL EVERY 6 HOURS PRN
Status: DISCONTINUED | OUTPATIENT
Start: 2019-04-10 | End: 2019-04-12 | Stop reason: HOSPADM

## 2019-04-10 RX ORDER — ACETAMINOPHEN 325 MG/1
650 TABLET ORAL EVERY 4 HOURS PRN
Status: DISCONTINUED | OUTPATIENT
Start: 2019-04-10 | End: 2019-04-12 | Stop reason: HOSPADM

## 2019-04-10 RX ORDER — NICOTINE POLACRILEX 4 MG
15 LOZENGE BUCCAL
Status: DISCONTINUED | OUTPATIENT
Start: 2019-04-10 | End: 2019-04-12 | Stop reason: HOSPADM

## 2019-04-10 RX ORDER — CYCLOBENZAPRINE HCL 10 MG
10 TABLET ORAL 3 TIMES DAILY PRN
Status: DISCONTINUED | OUTPATIENT
Start: 2019-04-10 | End: 2019-04-12 | Stop reason: HOSPADM

## 2019-04-10 RX ORDER — ONDANSETRON 4 MG/1
4 TABLET, FILM COATED ORAL EVERY 6 HOURS PRN
Status: DISCONTINUED | OUTPATIENT
Start: 2019-04-10 | End: 2019-04-12 | Stop reason: HOSPADM

## 2019-04-10 RX ORDER — LISINOPRIL 2.5 MG/1
5 TABLET ORAL DAILY
Status: DISCONTINUED | OUTPATIENT
Start: 2019-04-10 | End: 2019-04-12 | Stop reason: HOSPADM

## 2019-04-10 RX ORDER — PIOGLITAZONEHYDROCHLORIDE 15 MG/1
30 TABLET ORAL DAILY
Status: DISCONTINUED | OUTPATIENT
Start: 2019-04-10 | End: 2019-04-12 | Stop reason: HOSPADM

## 2019-04-10 RX ORDER — CHLORTHALIDONE 50 MG/1
12.5 TABLET ORAL DAILY
Status: DISCONTINUED | OUTPATIENT
Start: 2019-04-10 | End: 2019-04-12 | Stop reason: HOSPADM

## 2019-04-10 RX ORDER — MULTIVITAMIN
1 TABLET ORAL DAILY
Status: DISCONTINUED | OUTPATIENT
Start: 2019-04-10 | End: 2019-04-12 | Stop reason: HOSPADM

## 2019-04-10 RX ORDER — MULTIVITAMIN WITH IRON
500 TABLET ORAL NIGHTLY
COMMUNITY

## 2019-04-10 RX ORDER — HYDROCODONE BITARTRATE AND ACETAMINOPHEN 5; 325 MG/1; MG/1
1 TABLET ORAL ONCE
Status: COMPLETED | OUTPATIENT
Start: 2019-04-10 | End: 2019-04-10

## 2019-04-10 RX ORDER — AMITRIPTYLINE HYDROCHLORIDE 25 MG/1
25 TABLET, FILM COATED ORAL NIGHTLY
COMMUNITY

## 2019-04-10 RX ADMIN — Medication 1 TABLET: at 08:39

## 2019-04-10 RX ADMIN — SODIUM CHLORIDE, PRESERVATIVE FREE 3 ML: 5 INJECTION INTRAVENOUS at 08:40

## 2019-04-10 RX ADMIN — ACETAMINOPHEN 650 MG: 325 TABLET, FILM COATED ORAL at 16:27

## 2019-04-10 RX ADMIN — MAGNESIUM GLUCONATE 500 MG ORAL TABLET 400 MG: 500 TABLET ORAL at 21:26

## 2019-04-10 RX ADMIN — ASPIRIN 81 MG: 81 TABLET ORAL at 08:39

## 2019-04-10 RX ADMIN — METOPROLOL TARTRATE 12.5 MG: 25 TABLET, FILM COATED ORAL at 08:39

## 2019-04-10 RX ADMIN — ACETAMINOPHEN 650 MG: 325 TABLET, FILM COATED ORAL at 04:47

## 2019-04-10 RX ADMIN — CETIRIZINE HCL 10 MG: 10 TABLET ORAL at 21:26

## 2019-04-10 RX ADMIN — METOPROLOL TARTRATE 12.5 MG: 25 TABLET, FILM COATED ORAL at 21:26

## 2019-04-10 RX ADMIN — AMITRIPTYLINE HYDROCHLORIDE 25 MG: 25 TABLET, FILM COATED ORAL at 21:26

## 2019-04-10 RX ADMIN — VANCOMYCIN HYDROCHLORIDE 2000 MG: 5 INJECTION, POWDER, LYOPHILIZED, FOR SOLUTION INTRAVENOUS at 01:57

## 2019-04-10 RX ADMIN — SODIUM CHLORIDE 1000 ML: 9 INJECTION, SOLUTION INTRAVENOUS at 00:38

## 2019-04-10 RX ADMIN — LINAGLIPTIN 5 MG: 5 TABLET, FILM COATED ORAL at 08:39

## 2019-04-10 RX ADMIN — METFORMIN HYDROCHLORIDE 850 MG: 850 TABLET ORAL at 18:33

## 2019-04-10 RX ADMIN — CHLORTHALIDONE 12.5 MG: 50 TABLET ORAL at 08:38

## 2019-04-10 RX ADMIN — SODIUM CHLORIDE, PRESERVATIVE FREE 3 ML: 5 INJECTION INTRAVENOUS at 21:26

## 2019-04-10 RX ADMIN — ACETAMINOPHEN 650 MG: 325 TABLET, FILM COATED ORAL at 23:36

## 2019-04-10 RX ADMIN — METFORMIN HYDROCHLORIDE 850 MG: 850 TABLET ORAL at 08:39

## 2019-04-10 RX ADMIN — VANCOMYCIN HYDROCHLORIDE 1250 MG: 5 INJECTION, POWDER, LYOPHILIZED, FOR SOLUTION INTRAVENOUS at 16:21

## 2019-04-10 RX ADMIN — LISINOPRIL 5 MG: 2.5 TABLET ORAL at 08:38

## 2019-04-10 RX ADMIN — PIOGLITAZONE 30 MG: 15 TABLET ORAL at 08:39

## 2019-04-10 RX ADMIN — HYDROCODONE BITARTRATE AND ACETAMINOPHEN 1 TABLET: 5; 325 TABLET ORAL at 02:09

## 2019-04-10 NOTE — ED NOTES
Pt resting quietly on stretcher with complaints of right arm pain.  Pt AAOx4 with no resp distress noted, respirations even and unlabored.  Pt denies any needs at this time.  Pt family at bedside. Will continue to monitor and follow plan of care.  Bed rails up x2, bed in lowest position, call light in reach.           Mally Mckenzie, RN  04/10/19 [id number]

## 2019-04-10 NOTE — PLAN OF CARE
Problem: Patient Care Overview  Goal: Plan of Care Review  Outcome: Ongoing (interventions implemented as appropriate)      Problem: Infection, Risk/Actual (Adult)  Goal: Identify Related Risk Factors and Signs and Symptoms  Outcome: Ongoing (interventions implemented as appropriate)    Goal: Infection Prevention/Resolution  Outcome: Ongoing (interventions implemented as appropriate)      Problem: Confusion, Chronic (Adult)  Goal: Identify Related Risk Factors and Signs and Symptoms  Outcome: Ongoing (interventions implemented as appropriate)    Goal: Cognitive/Functional Impairments Minimized  Outcome: Ongoing (interventions implemented as appropriate)    Goal: Free from Harm/Injuries  Outcome: Ongoing (interventions implemented as appropriate)      Problem: Patient Care Overview  Goal: Plan of Care Review  Outcome: Ongoing (interventions implemented as appropriate)

## 2019-04-10 NOTE — ED NOTES
Pt transferred to inpatient floor at this time. NADN, skin PWD, no resp distress noted. IV intact with no signs of infiltration. All pt belongings transferred with pt. Pt transferred via wheelchair with Mally Novoa RN  04/10/19 7783

## 2019-04-10 NOTE — ED PROVIDER NOTES
Subjective   53-year-old female presents to the emergency room today complaining of right middle finger pain and arm pain.  Patient states that over the weekend she was working out in her yard but did not know that she had an injury or had had anything happen but states that she woke up this morning with redness noted to her middle finger on her right hand and as the day has progressed streaking has went up her arm extending to the elbow.  Patient denies any fevers at home.  Patient states she has had increased fatigue.  States she has laid in the bed all day long.  Has not ate or drink anything.            Review of Systems   Constitutional: Negative.    HENT: Negative.    Eyes: Negative.    Respiratory: Negative.    Cardiovascular: Negative.    Gastrointestinal: Negative.    Endocrine: Negative.    Genitourinary: Negative.    Musculoskeletal: Negative.    Skin: Negative.    Allergic/Immunologic: Negative.    Neurological: Negative.    Hematological: Negative.    Psychiatric/Behavioral: Negative.    All other systems reviewed and are negative.      Past Medical History:   Diagnosis Date   • Arteriosclerosis    • Arthritis    • Asthma    • Carpal tunnel syndrome of left wrist    • Chronic headaches    • Coronary artery disease    • Diabetes mellitus (CMS/HCC)    • Diverticular disease    • Fibromyalgia    • Gout    • Hypertension    • Insomnia    • Migraines    • Stroke (CMS/HCC)            Allergies   Allergen Reactions   • Corticosteroids    • Prednisone Other (See Comments)       Past Surgical History:   Procedure Laterality Date   • ABDOMINAL SURGERY     • CARPAL TUNNEL RELEASE Right    •  SECTION     • COLONOSCOPY     • HYSTERECTOMY     • ULNAR NERVE DECOMPRESSION Left 2017    Procedure: ULNAR NERVE RELEASE, CARPAL TUNNEL RELEASE;  Surgeon: Wilmer Mayen MD;  Location: [facility];  Service:    • VOCAL CORD MEDIALIZATION WITH MONTOGOMERY THYROPLASTY         Family History   Problem  Relation Age of Onset   • Heart disease Mother    • Diabetes Mother    • Hypertension Mother    • Cancer Father    • Cancer Sister    • Diabetes Sister    • Hypertension Sister    • Hypertension Brother        Social History     Socioeconomic History   • Marital status:      Spouse name: Not on file   • Number of children: Not on file   • Years of education: Not on file   • Highest education level: Not on file   Tobacco Use   • Smoking status: Current Every Day Smoker     Packs/day: 0.25     Years: 30.00     Pack years: 7.50     Types: Cigarettes   Substance and Sexual Activity   • Alcohol use: No     Comment: FORMER   • Drug use: No   • Sexual activity: Defer           Objective   Physical Exam   Constitutional: She is oriented to person, place, and time. She appears well-developed and well-nourished.   HENT:   Head: Normocephalic and atraumatic.   Right Ear: External ear normal.   Left Ear: External ear normal.   Nose: Nose normal.   Mouth/Throat: Oropharynx is clear and moist.   Eyes: Conjunctivae and EOM are normal. Pupils are equal, round, and reactive to light.   Neck: Normal range of motion. Neck supple.   Cardiovascular: Normal rate, regular rhythm, normal heart sounds and intact distal pulses.   Pulmonary/Chest: Effort normal and breath sounds normal.   Abdominal: Soft. Bowel sounds are normal.   Musculoskeletal: Normal range of motion.        Hands:  Neurological: She is alert and oriented to person, place, and time.   Skin: Skin is warm and dry.   Nursing note and vitals reviewed.      Procedures           ED Course  ED Course as of Apr 10 0142   Wed Apr 10, 2019   0018 Neg per Dr. Orlando  XR Hand 2 View Right [ML]   0134 D/W Sherry Calvo will accept to observation   [ML]      ED Course User Index  [ML] Miesha Shirley PA                  [facility]      Final diagnoses:   Cellulitis of finger of right hand            Miesha Shirley PA  04/10/19 0142

## 2019-04-10 NOTE — PROGRESS NOTES
Pharmacy was consulted for vancomycin dosing for cellulitis R hand.  Based on pt parameters will initiate vancomycin at 1250 mg iv q12hrs after the 2g x 1 loading dose to target trough levels of 15-20 mg/L.  Pharmacy will continue to follow and obtain trough level once steady state is achieved.  Thank you.

## 2019-04-10 NOTE — PLAN OF CARE
Problem: Patient Care Overview  Goal: Plan of Care Review  Outcome: Ongoing (interventions implemented as appropriate)    Goal: Individualization and Mutuality  Outcome: Ongoing (interventions implemented as appropriate)    Goal: Discharge Needs Assessment  Outcome: Ongoing (interventions implemented as appropriate)    Goal: Interprofessional Rounds/Family Conf  Outcome: Ongoing (interventions implemented as appropriate)      Problem: Infection, Risk/Actual (Adult)  Goal: Identify Related Risk Factors and Signs and Symptoms  Outcome: Ongoing (interventions implemented as appropriate)    Goal: Infection Prevention/Resolution  Outcome: Ongoing (interventions implemented as appropriate)      Problem: Confusion, Chronic (Adult)  Goal: Identify Related Risk Factors and Signs and Symptoms  Outcome: Ongoing (interventions implemented as appropriate)    Goal: Cognitive/Functional Impairments Minimized  Outcome: Ongoing (interventions implemented as appropriate)    Goal: Free from Harm/Injuries  Outcome: Ongoing (interventions implemented as appropriate)      Problem: Patient Care Overview  Goal: Plan of Care Review  Outcome: Ongoing (interventions implemented as appropriate)    Goal: Individualization and Mutuality  Outcome: Ongoing (interventions implemented as appropriate)    Goal: Discharge Needs Assessment  Outcome: Ongoing (interventions implemented as appropriate)    Goal: Interprofessional Rounds/Family Conf  Outcome: Ongoing (interventions implemented as appropriate)

## 2019-04-10 NOTE — H&P
HISTORY AND PHYSICAL    Patient Identification:  Name:  Debbie Strauss  Age:  53 y.o.  Sex:  female  :  1965  MRN:  [id number]   Visit Number:  [id number]  Primary Care Physician:  Hardeep Gómez MD       Subjective     Subjective     Chief complaint:     Chief Complaint   Patient presents with   • Arm Pain       History of presenting illness:     53-year-old female past medical history of arteriosclerosis, arthritis, asthma, carpal tunnel syndrome, chronic headaches, CAD, diabetes mellitus, fibromyalgia, gout, hypertension and stroke in .  Presents to the emergency room today complaining of right middle finger pain and arm pain.  Patient states that over the weekend she was working out in her yard but did not know that she had an injury or had had anything happen but states that she woke up this morning with redness noted to her middle finger on her right hand and as the day has progressed streaking has went up her arm extending to the elbow.  Patient denies any fevers at home.  Patient states she has had increased fatigue.  States she has laid in the bed all day long.  Has not ate or drink anything.  Patient does report nausea without vomiting or abdominal pain.  Denies chest pain, dizziness, or heart palpitations.  Denies any abnormal bleeding.    Workup in ED included CRP 1.54, lactate 1.0, white count within normal limits, blood cultures pending, x-ray pending.    Patient was admitted to the observation unit for further evaluation and monitoring.    ---------------------------------------------------------------------------------------------------------------------  Review of Systems   Constitutional: Positive for fatigue. Negative for activity change, appetite change, chills, diaphoresis and fever.   HENT: Negative for congestion, rhinorrhea and sneezing.    Eyes: Negative for pain and visual disturbance.   Respiratory: Negative for cough and shortness of breath.    Cardiovascular:  Negative for chest pain, palpitations and leg swelling.   Gastrointestinal: Positive for nausea. Negative for blood in stool, constipation, diarrhea and vomiting.   Endocrine: Negative for cold intolerance and heat intolerance.   Genitourinary: Negative for difficulty urinating, flank pain and frequency.   Musculoskeletal: Positive for joint swelling.   Skin: Negative for rash and wound.   Allergic/Immunologic: Negative for food allergies.   Neurological: Negative for dizziness, syncope, weakness, numbness and headaches.   Psychiatric/Behavioral: Negative for confusion.     ---------------------------------------------------------------------------------------------------------------------     Past Medical History    Past Medical History:   Diagnosis Date   • Arteriosclerosis    • Arthritis    • Asthma    • Carpal tunnel syndrome of left wrist    • Chronic headaches    • Coronary artery disease    • Diabetes mellitus (CMS/HCC)    • Diverticular disease    • Fibromyalgia    • Gout    • Hypertension    • Insomnia    • Migraines    • Stroke (CMS/HCC)            Past Surgical History    Past Surgical History:   Procedure Laterality Date   • ABDOMINAL SURGERY     • CARPAL TUNNEL RELEASE Right    •  SECTION     • COLONOSCOPY     • HYSTERECTOMY     • ULNAR NERVE DECOMPRESSION Left 2017    Procedure: ULNAR NERVE RELEASE, CARPAL TUNNEL RELEASE;  Surgeon: Wilmer Mayen MD;  Location: [facility];  Service:    • VOCAL CORD MEDIALIZATION WITH MONTOGOMERY THYROPLASTY         Family History    Family History   Problem Relation Age of Onset   • Heart disease Mother    • Diabetes Mother    • Hypertension Mother    • Cancer Father    • Cancer Sister    • Diabetes Sister    • Hypertension Sister    • Hypertension Brother        Social History    Social History     Tobacco Use   • Smoking status: Current Every Day Smoker     Packs/day: 0.25     Years: 30.00     Pack years: 7.50     Types: Cigarettes   Substance  Use Topics   • Alcohol use: No     Comment: FORMER   • Drug use: No       Allergies    Corticosteroids and Prednisone  ---------------------------------------------------------------------------------------------------------------------     Home Medications:    Prior to Admission Medications     Prescriptions Last Dose Informant Patient Reported? Taking?    albuterol (PROVENTIL HFA;VENTOLIN HFA) 108 (90 BASE) MCG/ACT inhaler 4/9/2019 Self Yes Yes    Inhale 2 puffs Every 4 (Four) Hours As Needed for Wheezing or Shortness of Air. Ventolin  (90 Base) MCG/ACT Inhalation Aerosol Solution; Patient Sig: Ventolin  (90 Base) MCG/ACT Inhalation Aerosol Solution ; 0; 19-Oct-2015; Active    amitriptyline (ELAVIL) 25 MG tablet 4/8/2019 Self Yes Yes    Take 25 mg by mouth Every Night.    aspirin 81 MG tablet 4/9/2019 Self Yes Yes    Take 81 mg by mouth Daily.    cetirizine (zyrTEC) 10 MG tablet 4/8/2019 Self Yes Yes    Take 10 mg by mouth Every Night.    chlorthalidone (HYGROTON) 25 MG tablet 4/9/2019 Self Yes Yes    Take 12.5 mg by mouth Daily.    cyclobenzaprine (FLEXERIL) 10 MG tablet 4/9/2019 Self Yes Yes    Take 10 mg by mouth 3 (Three) Times a Day As Needed for Muscle Spasms.    JANUVIA 100 MG tablet 4/9/2019  Yes Yes    Take 100 mg by mouth Daily.    lisinopril (PRINIVIL,ZESTRIL) 5 MG tablet 4/9/2019 Self Yes Yes    Take 5 mg by mouth 2 (Two) Times a Day.    Magnesium 250 MG tablet 4/8/2019 Self Yes Yes    Take 500 mg by mouth Every Night.    metFORMIN (GLUCOPHAGE) 850 MG tablet 4/9/2019 Self Yes Yes    Take 850 mg by mouth 2 (Two) Times a Day With Meals.    metoprolol tartrate (LOPRESSOR) 25 MG tablet 4/9/2019 Self Yes Yes    Take 12.5 mg by mouth 2 (Two) Times a Day.    multivitamin (DAILY RAF) tablet tablet 4/9/2019 Self Yes Yes    Take 1 tablet by mouth Daily.    pioglitazone (ACTOS) 30 MG tablet 4/9/2019 Self Yes Yes    Take 30 mg by mouth Daily.         ---------------------------------------------------------------------------------------------------------------------    Objective     Objective     Hospital Scheduled Meds:    amitriptyline 25 mg Oral Nightly   aspirin 81 mg Oral Daily   cetirizine 10 mg Oral Nightly   chlorthalidone 12.5 mg Oral Daily   linagliptin 5 mg Oral Daily   lisinopril 5 mg Oral Daily   magnesium oxide 400 mg Oral Nightly   metFORMIN 850 mg Oral BID With Meals   metoprolol tartrate 12.5 mg Oral BID   multivitamin 1 tablet Oral Daily   pioglitazone 30 mg Oral Daily   sodium chloride 3 mL Intravenous Q12H   vancomycin 2,000 mg Intravenous Q18H        ---------------------------------------------------------------------------------------------------------------------   Vital Signs:  Temp:  [98.1 °F (36.7 °C)-98.4 °F (36.9 °C)] 98.4 °F (36.9 °C)  Heart Rate:  [79-89] 79  Resp:  [18] 18  BP: (109-136)/(41-81) 125/72  Mean Arterial Pressure (Non-Invasive) for the past 24 hrs (Last 3 readings):   Noninvasive MAP (mmHg)   04/10/19 0309 100   04/10/19 0043 92     SpO2 Percentage    04/09/19 2157 04/10/19 0043 04/10/19 0309   SpO2: 100% 99% 99%     SpO2:  [99 %-100 %] 99 %  on   ;   Device (Oxygen Therapy): room air    Body mass index is 43.11 kg/m².  Wt Readings from Last 3 Encounters:   04/10/19 114 kg (251 lb 4.8 oz)   11/22/17 106 kg (234 lb)   02/27/17 106 kg (234 lb)     ---------------------------------------------------------------------------------------------------------------------     Physical Exam:    Constitutional:  Well-developed and well-nourished.  No respiratory distress.      HENT:  Head: Normocephalic and atraumatic.  Mouth:  Moist mucous membranes.    Eyes:  Conjunctivae and EOM are normal.  No scleral icterus.  Neck:  Neck supple.  No JVD present.    Cardiovascular:  Normal rate, regular rhythm and normal heart sounds with no murmur. No edema.  Pulmonary/Chest:  No respiratory distress, no wheezes, no crackles, with normal  breath sounds and good air movement.  Abdominal:  Soft.  Bowel sounds are normal.  No distension and no tenderness.   Musculoskeletal:  No edema, no tenderness, and no deformity.  No swelling or redness of joints.  Neurological:  Alert and oriented to person, place, and time.  No facial droop.  No slurred speech.   Skin:  Right middle finger, swollen and tinder to touch. Erythema noted to right hand expanding into right upper arm. Area marked by nursing staff  Psychiatric:  Normal mood and affect.  Behavior is normal.    ---------------------------------------------------------------------------------------------------------------------  I have personally reviewed the EKG/Telemetry strip  ---------------------------------------------------------------------------------------------------------------------             Results from last 7 days   Lab Units 04/10/19  0337 04/10/19  0018   CRP mg/dL 1.31* 1.54*   LACTATE mmol/L  --  1.0   WBC 10*3/mm3 6.22 5.94   HEMOGLOBIN g/dL 12.2 13.4   HEMATOCRIT % 37.4 40.7   MCV fL 86.8 85.3   MCHC g/dL 32.6 32.9   PLATELETS 10*3/mm3 265 304     Results from last 7 days   Lab Units 04/10/19  0337 04/10/19  0018   SODIUM mmol/L 136 136   POTASSIUM mmol/L 4.1 4.2   MAGNESIUM mg/dL 1.9  --    CHLORIDE mmol/L 102 98   CO2 mmol/L 20.8* 24.1   BUN mg/dL 21* 22*   CREATININE mg/dL 1.03* 1.16*   EGFR IF NONAFRICN AM mL/min/1.73 56* 49*   CALCIUM mg/dL 8.7 9.4   GLUCOSE mg/dL 104* 142*   ALBUMIN g/dL  --  3.70   BILIRUBIN mg/dL  --  0.2   ALK PHOS U/L  --  60   AST (SGOT) U/L  --  18   ALT (SGPT) U/L  --  12   Estimated Creatinine Clearance: 76.4 mL/min (A) (by C-G formula based on SCr of 1.03 mg/dL (H)).  No results found for: AMMONIA    No results found for: HGBA1C, POCGLU  No results found for: HGBA1C  No results found for: TSH, FREET4                   Pain Management Panel     There is no flowsheet data to display.        I have personally reviewed the above laboratory results.    ---------------------------------------------------------------------------------------------------------------------  Imaging Results (last 7 days)     Procedure Component Value Units Date/Time    XR Hand 2 View Right [[id number]] Updated:  04/10/19 0025        I have personally reviewed the above radiology results.   ---------------------------------------------------------------------------------------------------------------------      Assessment & Plan        Assessment/Plan       ASSESSMENT:    1.  Cellulitis of finger right hand    PLAN:    53-year-old female past medical history of arteriosclerosis, arthritis, asthma, carpal tunnel syndrome, chronic headaches, CAD, diabetes mellitus, fibromyalgia, gout, hypertension and stroke in 2014.  Presents to the emergency room today complaining of right middle finger pain and arm pain.  Patient states that over the weekend she was working out in her yard but did not know that she had an injury or had had anything happen but states that she woke up this morning with redness noted to her middle finger on her right hand and as the day has progressed streaking has went up her arm extending to the elbow.  Patient denies any fevers at home.  Patient states she has had increased fatigue.  States she has laid in the bed all day long.  Has not ate or drink anything.  Patient does report nausea without vomiting or abdominal pain.  Denies chest pain, dizziness, or heart palpitations.  Denies any abnormal bleeding.    Workup in ED included CRP 1.54, lactate 1.0, white count within normal limits, blood cultures pending, x-ray pending.    Patient was admitted to the observation unit for further evaluation and monitoring.    Vancomycin ordered per pharmacy to dose    Low-dose sliding scale NovoLog ordered    We will repeat labs in a.m.    Patient's findings and recommendations were discussed with patient, family and nursing staff      Code Status:   Code Status and Medical  Interventions:   Ordered at: 04/10/19 0206     Level Of Support Discussed With:    Patient     Code Status:    CPR     Medical Interventions (Level of Support Prior to Arrest):    Full           Sherry Calvo, COCO  04/10/19  5:23 AM

## 2019-04-10 NOTE — PROGRESS NOTES
Patient initiated on vancomycin for cellulitis. Based on patient's age, weight and SCr, will start vancomycin at 2000 IV q18h. Will check a trough level at steady state and follow.    Thank you for this consult,  Chetan AcD

## 2019-04-10 NOTE — PLAN OF CARE
Problem: Patient Care Overview  Goal: Plan of Care Review  Outcome: Ongoing (interventions implemented as appropriate)      Problem: Infection, Risk/Actual (Adult)  Goal: Identify Related Risk Factors and Signs and Symptoms  Outcome: Ongoing (interventions implemented as appropriate)    Goal: Infection Prevention/Resolution  Outcome: Ongoing (interventions implemented as appropriate)      Problem: Confusion, Chronic (Adult)  Goal: Identify Related Risk Factors and Signs and Symptoms  Outcome: Ongoing (interventions implemented as appropriate)    Goal: Cognitive/Functional Impairments Minimized  Outcome: Ongoing (interventions implemented as appropriate)    Goal: Free from Harm/Injuries  Outcome: Ongoing (interventions implemented as appropriate)

## 2019-04-11 LAB
ANION GAP SERPL CALCULATED.3IONS-SCNC: 14.3 MMOL/L
BASOPHILS # BLD AUTO: 0.03 10*3/MM3 (ref 0–0.2)
BASOPHILS NFR BLD AUTO: 0.6 % (ref 0–1.5)
BUN BLD-MCNC: 18 MG/DL (ref 6–20)
BUN/CREAT SERPL: 15.5 (ref 7–25)
CALCIUM SPEC-SCNC: 8.6 MG/DL (ref 8.6–10.5)
CHLORIDE SERPL-SCNC: 101 MMOL/L (ref 98–107)
CO2 SERPL-SCNC: 20.7 MMOL/L (ref 22–29)
CREAT BLD-MCNC: 1.16 MG/DL (ref 0.57–1)
CRP SERPL-MCNC: 1.06 MG/DL (ref 0–0.5)
DEPRECATED RDW RBC AUTO: 51.2 FL (ref 37–54)
EOSINOPHIL # BLD AUTO: 0.13 10*3/MM3 (ref 0–0.4)
EOSINOPHIL NFR BLD AUTO: 2.6 % (ref 0.3–6.2)
ERYTHROCYTE [DISTWIDTH] IN BLOOD BY AUTOMATED COUNT: 16.3 % (ref 12.3–15.4)
GFR SERPL CREATININE-BSD FRML MDRD: 49 ML/MIN/1.73
GLUCOSE BLD-MCNC: 99 MG/DL (ref 65–99)
GLUCOSE BLDC GLUCOMTR-MCNC: 100 MG/DL (ref 70–130)
GLUCOSE BLDC GLUCOMTR-MCNC: 113 MG/DL (ref 70–130)
GLUCOSE BLDC GLUCOMTR-MCNC: 130 MG/DL (ref 70–130)
GLUCOSE BLDC GLUCOMTR-MCNC: 165 MG/DL (ref 70–130)
HCT VFR BLD AUTO: 38.6 % (ref 34–46.6)
HGB BLD-MCNC: 12.7 G/DL (ref 12–15.9)
IMM GRANULOCYTES # BLD AUTO: 0.01 10*3/MM3 (ref 0–0.05)
IMM GRANULOCYTES NFR BLD AUTO: 0.2 % (ref 0–0.5)
LYMPHOCYTES # BLD AUTO: 1.44 10*3/MM3 (ref 0.7–3.1)
LYMPHOCYTES NFR BLD AUTO: 28.6 % (ref 19.6–45.3)
MAGNESIUM SERPL-MCNC: 2 MG/DL (ref 1.6–2.6)
MCH RBC QN AUTO: 28.4 PG (ref 26.6–33)
MCHC RBC AUTO-ENTMCNC: 32.9 G/DL (ref 31.5–35.7)
MCV RBC AUTO: 86.4 FL (ref 79–97)
MONOCYTES # BLD AUTO: 0.91 10*3/MM3 (ref 0.1–0.9)
MONOCYTES NFR BLD AUTO: 18.1 % (ref 5–12)
NEUTROPHILS # BLD AUTO: 2.51 10*3/MM3 (ref 1.4–7)
NEUTROPHILS NFR BLD AUTO: 49.9 % (ref 42.7–76)
PLATELET # BLD AUTO: 268 10*3/MM3 (ref 140–450)
PMV BLD AUTO: 10.9 FL (ref 6–12)
POTASSIUM BLD-SCNC: 4.1 MMOL/L (ref 3.5–5.2)
RBC # BLD AUTO: 4.47 10*6/MM3 (ref 3.77–5.28)
SODIUM BLD-SCNC: 136 MMOL/L (ref 136–145)
WBC NRBC COR # BLD: 5.03 10*3/MM3 (ref 3.4–10.8)

## 2019-04-11 PROCEDURE — 80048 BASIC METABOLIC PNL TOTAL CA: CPT | Performed by: NURSE PRACTITIONER

## 2019-04-11 PROCEDURE — 96367 TX/PROPH/DG ADDL SEQ IV INF: CPT

## 2019-04-11 PROCEDURE — 94799 UNLISTED PULMONARY SVC/PX: CPT

## 2019-04-11 PROCEDURE — 85025 COMPLETE CBC W/AUTO DIFF WBC: CPT | Performed by: NURSE PRACTITIONER

## 2019-04-11 PROCEDURE — 96366 THER/PROPH/DIAG IV INF ADDON: CPT

## 2019-04-11 PROCEDURE — 25010000002 VANCOMYCIN 5 G RECONSTITUTED SOLUTION 5,000 MG VIAL

## 2019-04-11 PROCEDURE — 25010000002 CEFTRIAXONE: Performed by: NURSE PRACTITIONER

## 2019-04-11 PROCEDURE — 83735 ASSAY OF MAGNESIUM: CPT | Performed by: NURSE PRACTITIONER

## 2019-04-11 PROCEDURE — 82962 GLUCOSE BLOOD TEST: CPT

## 2019-04-11 PROCEDURE — G0378 HOSPITAL OBSERVATION PER HR: HCPCS

## 2019-04-11 PROCEDURE — 63710000001 INSULIN ASPART PER 5 UNITS: Performed by: NURSE PRACTITIONER

## 2019-04-11 PROCEDURE — 86140 C-REACTIVE PROTEIN: CPT | Performed by: NURSE PRACTITIONER

## 2019-04-11 PROCEDURE — 96361 HYDRATE IV INFUSION ADD-ON: CPT

## 2019-04-11 RX ORDER — SODIUM CHLORIDE 9 MG/ML
100 INJECTION, SOLUTION INTRAVENOUS CONTINUOUS
Status: DISCONTINUED | OUTPATIENT
Start: 2019-04-11 | End: 2019-04-12 | Stop reason: HOSPADM

## 2019-04-11 RX ADMIN — ASPIRIN 81 MG: 81 TABLET ORAL at 07:51

## 2019-04-11 RX ADMIN — SODIUM CHLORIDE 100 ML/HR: 9 INJECTION, SOLUTION INTRAVENOUS at 09:36

## 2019-04-11 RX ADMIN — LISINOPRIL 5 MG: 2.5 TABLET ORAL at 07:51

## 2019-04-11 RX ADMIN — MAGNESIUM GLUCONATE 500 MG ORAL TABLET 400 MG: 500 TABLET ORAL at 21:17

## 2019-04-11 RX ADMIN — CHLORTHALIDONE 12.5 MG: 50 TABLET ORAL at 07:52

## 2019-04-11 RX ADMIN — VANCOMYCIN HYDROCHLORIDE 1250 MG: 5 INJECTION, POWDER, LYOPHILIZED, FOR SOLUTION INTRAVENOUS at 16:28

## 2019-04-11 RX ADMIN — METFORMIN HYDROCHLORIDE 850 MG: 850 TABLET ORAL at 21:17

## 2019-04-11 RX ADMIN — Medication 1 TABLET: at 07:50

## 2019-04-11 RX ADMIN — CETIRIZINE HCL 10 MG: 10 TABLET ORAL at 21:17

## 2019-04-11 RX ADMIN — METOPROLOL TARTRATE 12.5 MG: 25 TABLET, FILM COATED ORAL at 07:51

## 2019-04-11 RX ADMIN — SODIUM CHLORIDE, PRESERVATIVE FREE 3 ML: 5 INJECTION INTRAVENOUS at 21:17

## 2019-04-11 RX ADMIN — SODIUM CHLORIDE 100 ML/HR: 9 INJECTION, SOLUTION INTRAVENOUS at 21:17

## 2019-04-11 RX ADMIN — INSULIN ASPART 2 UNITS: 100 INJECTION, SOLUTION INTRAVENOUS; SUBCUTANEOUS at 19:05

## 2019-04-11 RX ADMIN — CEFTRIAXONE 2 G: 2 INJECTION, POWDER, FOR SOLUTION INTRAMUSCULAR; INTRAVENOUS at 21:21

## 2019-04-11 RX ADMIN — PIOGLITAZONE 30 MG: 15 TABLET ORAL at 07:50

## 2019-04-11 RX ADMIN — VANCOMYCIN HYDROCHLORIDE 1250 MG: 5 INJECTION, POWDER, LYOPHILIZED, FOR SOLUTION INTRAVENOUS at 03:43

## 2019-04-11 RX ADMIN — LINAGLIPTIN 5 MG: 5 TABLET, FILM COATED ORAL at 09:38

## 2019-04-11 RX ADMIN — AMITRIPTYLINE HYDROCHLORIDE 25 MG: 25 TABLET, FILM COATED ORAL at 21:17

## 2019-04-11 RX ADMIN — METFORMIN HYDROCHLORIDE 850 MG: 850 TABLET ORAL at 07:51

## 2019-04-11 NOTE — PROGRESS NOTES
PROGRESS NOTE         Patient Identification:  Name:  Debbie Strauss  Age:  53 y.o.  Sex:  female  :  1965  MRN:  [id number]  Visit Number:  [id number]  Primary Care Provider:  Hardeep Gómez MD      ----------------------------------------------------------------------------------------------------------------------  Subjective       Chief Complaints:    Arm Pain        Interval History:      Patient's middle finger continues to have redness, erythema, and streaking extending up to elbow. WBC normal. CRP improving at 1.06. Creatinine stable at 1.16.     Review of Systems:    Constitutional: no fever, chills and night sweats. No appetite change or unexpected weight change. No fatigue.  Eyes: no eye drainage, itching or redness.  HEENT: no mouth sores, dysphagia or nose bleed.  Respiratory: no for shortness of breath, cough or production of sputum.  Cardiovascular: no chest pain, no palpitations, no orthopnea.  Gastrointestinal: no nausea, vomiting or diarrhea. No abdominal pain, hematemesis or rectal bleeding.  Genitourinary: no dysuria or polyuria.  Hematologic/lymphatic: no lymph node abnormalities, no easy bruising or easy bleeding.  Musculoskeletal: Right hand pain.  Skin: No rash and no itching.  Neurological: no loss of consciousness, no seizure, no headache.  Behavioral/Psych: no depression or suicidal ideation.  Endocrine: no hot flashes.  Immunologic: negative.  ----------------------------------------------------------------------------------------------------------------------      Objective       Current Hospital Meds:    amitriptyline 25 mg Oral Nightly   aspirin 81 mg Oral Daily   cefepime 2 g Intravenous Once   [START ON 2019] cefepime 2 g Intravenous Q8H   cetirizine 10 mg Oral Nightly   chlorthalidone 12.5 mg Oral Daily   insulin aspart 0-7 Units Subcutaneous 4x Daily AC & at Bedtime   linagliptin 5 mg Oral Daily   lisinopril 5 mg Oral Daily   magnesium oxide 400 mg  Oral Nightly   metFORMIN 850 mg Oral BID With Meals   metoprolol tartrate 12.5 mg Oral BID   multivitamin 1 tablet Oral Daily   pioglitazone 30 mg Oral Daily   sodium chloride 3 mL Intravenous Q12H   vancomycin 1,250 mg Intravenous Q12H       sodium chloride 100 mL/hr Last Rate: 100 mL/hr (04/11/19 1512)     ----------------------------------------------------------------------------------------------------------------------    Vital Signs:  Temp:  [97.7 °F (36.5 °C)-98.6 °F (37 °C)] 98.3 °F (36.8 °C)  Heart Rate:  [70-81] 73  Resp:  [18] 18  BP: (100-121)/(52-75) 104/68  Mean Arterial Pressure (Non-Invasive) for the past 24 hrs (Last 3 readings):   Noninvasive MAP (mmHg)   04/11/19 1313 82   04/11/19 0903 67   04/11/19 0749 90     SpO2 Percentage    04/11/19 0707 04/11/19 0903 04/11/19 1313   SpO2: 96% 97% 99%     SpO2:  [96 %-99 %] 99 %  on   ;   Device (Oxygen Therapy): room air    Body mass index is 43.11 kg/m².  Wt Readings from Last 3 Encounters:   04/10/19 114 kg (251 lb 4.8 oz)   11/22/17 106 kg (234 lb)   02/27/17 106 kg (234 lb)      No intake or output data in the 24 hours ending 04/11/19 2453  Diet Regular; Consistent Carbohydrate  ----------------------------------------------------------------------------------------------------------------------    Physical exam:    Constitutional:  Well-developed and well-nourished.  No respiratory distress.      HENT:  Head:  Normocephalic and atraumatic.  Mouth:  Moist mucous membranes.    Eyes:  Conjunctivae and EOM are normal.  No scleral icterus.    Neck:  Neck supple.  No JVD present.    Cardiovascular:  Normal rate, regular rhythm and normal heart sounds with no murmur. No edema.  Pulmonary/Chest:  No respiratory distress, no wheezes, no crackles, with normal breath sounds and good air movement.  Abdominal:  Soft.  Bowel sounds are normal.  No distension and no tenderness.   Musculoskeletal: Right middle finger, swollen and tinder to touch. Erythema streaking  noted to right hand expanding into right upper arm. Area marked by nursing staff.  Neurological:  Alert and oriented to person, place, and time. No facial droop.  No slurred speech.   Skin:  Skin is warm and dry. No rash noted. No pallor.     ----------------------------------------------------------------------------------------------------------------------  I have personally reviewed the EKG/Telemetry strips   ----------------------------------------------------------------------------------------------------------------------            Results from last 7 days   Lab Units 04/11/19  0314 04/10/19  0337 04/10/19  0018   CRP mg/dL 1.06* 1.31* 1.54*   LACTATE mmol/L  --   --  1.0   WBC 10*3/mm3 5.03 6.22 5.94   HEMOGLOBIN g/dL 12.7 12.2 13.4   HEMATOCRIT % 38.6 37.4 40.7   MCV fL 86.4 86.8 85.3   MCHC g/dL 32.9 32.6 32.9   PLATELETS 10*3/mm3 268 265 304     Results from last 7 days   Lab Units 04/11/19  0314 04/10/19  0337 04/10/19  0018   SODIUM mmol/L 136 136 136   POTASSIUM mmol/L 4.1 4.1 4.2   MAGNESIUM mg/dL 2.0 1.9  --    CHLORIDE mmol/L 101 102 98   CO2 mmol/L 20.7* 20.8* 24.1   BUN mg/dL 18 21* 22*   CREATININE mg/dL 1.16* 1.03* 1.16*   EGFR IF NONAFRICN AM mL/min/1.73 49* 56* 49*   CALCIUM mg/dL 8.6 8.7 9.4   GLUCOSE mg/dL 99 104* 142*   ALBUMIN g/dL  --   --  3.70   BILIRUBIN mg/dL  --   --  0.2   ALK PHOS U/L  --   --  60   AST (SGOT) U/L  --   --  18   ALT (SGPT) U/L  --   --  12   Estimated Creatinine Clearance: 67.8 mL/min (A) (by C-G formula based on SCr of 1.16 mg/dL (H)).  No results found for: AMMONIA    Glucose   Date/Time Value Ref Range Status   04/11/2019 1124 130 70 - 130 mg/dL Final   04/11/2019 0715 113 70 - 130 mg/dL Final   04/10/2019 1937 112 70 - 130 mg/dL Final   04/10/2019 1701 128 70 - 130 mg/dL Final   04/10/2019 1059 94 70 - 130 mg/dL Final   04/10/2019 0726 104 70 - 130 mg/dL Final     No results found for: HGBA1C  No results found for: TSH, FREET4    Blood Culture   Date Value  Ref Range Status   04/10/2019 No growth at 24 hours  Preliminary   04/10/2019 No growth at 24 hours  Preliminary                 Pain Management Panel     There is no flowsheet data to display.          I have personally reviewed the above laboratory results.   ----------------------------------------------------------------------------------------------------------------------  Imaging Results (last 24 hours)     ** No results found for the last 24 hours. **        I have personally reviewed the above radiology results.   ----------------------------------------------------------------------------------------------------------------------    Assessment/Plan       Assessment/Plan     ASSESSMENT:    1.  Cellulitis of finger right hand    PLAN:    Patient's middle finger continues to have redness, erythema, and streaking extending up to elbow. WBC normal. CRP improving at 1.06. Creatinine stable at 1.16.     General surgery consulted for evaluation, however orthopedic surgery consult was felt to be more appropriate, awaiting their recommendations. CT of RUE with contrast ordered.    Rocephin 2gm IV Q24H was added for additional coverage. If no improvement may need to initiate Itraconazole for treatment of possible sporotrichosis, due to patient having noticed the redness after working in her garden.      Code Status:   Code Status and Medical Interventions:   Ordered at: 04/10/19 0206     Level Of Support Discussed With:    Patient     Code Status:    CPR     Medical Interventions (Level of Support Prior to Arrest):    Full       Donna Mathews, APRN  04/11/19  3:33 PM

## 2019-04-11 NOTE — NURSING NOTE
Dr. Jernigan contacted regarding inpatient surgery consult. He states the consult should be orthopedic, in case the tendons or bones are involved. KIRIT Thompson aware.

## 2019-04-12 VITALS
WEIGHT: 251.3 LBS | HEIGHT: 64 IN | OXYGEN SATURATION: 98 % | BODY MASS INDEX: 42.9 KG/M2 | DIASTOLIC BLOOD PRESSURE: 69 MMHG | SYSTOLIC BLOOD PRESSURE: 112 MMHG | TEMPERATURE: 97.8 F | HEART RATE: 80 BPM | RESPIRATION RATE: 18 BRPM

## 2019-04-12 LAB
ANION GAP SERPL CALCULATED.3IONS-SCNC: 12.1 MMOL/L
BASOPHILS # BLD AUTO: 0.02 10*3/MM3 (ref 0–0.2)
BASOPHILS NFR BLD AUTO: 0.4 % (ref 0–1.5)
BUN BLD-MCNC: 14 MG/DL (ref 6–20)
BUN/CREAT SERPL: 15.2 (ref 7–25)
CALCIUM SPEC-SCNC: 8.9 MG/DL (ref 8.6–10.5)
CHLORIDE SERPL-SCNC: 108 MMOL/L (ref 98–107)
CO2 SERPL-SCNC: 19.9 MMOL/L (ref 22–29)
CREAT BLD-MCNC: 0.92 MG/DL (ref 0.57–1)
CRP SERPL-MCNC: 0.68 MG/DL (ref 0–0.5)
DEPRECATED RDW RBC AUTO: 51.4 FL (ref 37–54)
EOSINOPHIL # BLD AUTO: 0.16 10*3/MM3 (ref 0–0.4)
EOSINOPHIL NFR BLD AUTO: 2.9 % (ref 0.3–6.2)
ERYTHROCYTE [DISTWIDTH] IN BLOOD BY AUTOMATED COUNT: 16.2 % (ref 12.3–15.4)
GFR SERPL CREATININE-BSD FRML MDRD: 63 ML/MIN/1.73
GLUCOSE BLD-MCNC: 104 MG/DL (ref 65–99)
GLUCOSE BLDC GLUCOMTR-MCNC: 104 MG/DL (ref 70–130)
HCT VFR BLD AUTO: 36.3 % (ref 34–46.6)
HGB BLD-MCNC: 11.9 G/DL (ref 12–15.9)
IMM GRANULOCYTES # BLD AUTO: 0.01 10*3/MM3 (ref 0–0.05)
IMM GRANULOCYTES NFR BLD AUTO: 0.2 % (ref 0–0.5)
LYMPHOCYTES # BLD AUTO: 1.85 10*3/MM3 (ref 0.7–3.1)
LYMPHOCYTES NFR BLD AUTO: 33.4 % (ref 19.6–45.3)
MAGNESIUM SERPL-MCNC: 1.9 MG/DL (ref 1.6–2.6)
MCH RBC QN AUTO: 28.4 PG (ref 26.6–33)
MCHC RBC AUTO-ENTMCNC: 32.8 G/DL (ref 31.5–35.7)
MCV RBC AUTO: 86.6 FL (ref 79–97)
MONOCYTES # BLD AUTO: 0.87 10*3/MM3 (ref 0.1–0.9)
MONOCYTES NFR BLD AUTO: 15.7 % (ref 5–12)
NEUTROPHILS # BLD AUTO: 2.63 10*3/MM3 (ref 1.4–7)
NEUTROPHILS NFR BLD AUTO: 47.4 % (ref 42.7–76)
PLATELET # BLD AUTO: 250 10*3/MM3 (ref 140–450)
PMV BLD AUTO: 10.4 FL (ref 6–12)
POTASSIUM BLD-SCNC: 3.8 MMOL/L (ref 3.5–5.2)
RBC # BLD AUTO: 4.19 10*6/MM3 (ref 3.77–5.28)
SODIUM BLD-SCNC: 140 MMOL/L (ref 136–145)
WBC NRBC COR # BLD: 5.54 10*3/MM3 (ref 3.4–10.8)

## 2019-04-12 PROCEDURE — 82962 GLUCOSE BLOOD TEST: CPT

## 2019-04-12 PROCEDURE — 96361 HYDRATE IV INFUSION ADD-ON: CPT

## 2019-04-12 PROCEDURE — 85025 COMPLETE CBC W/AUTO DIFF WBC: CPT | Performed by: NURSE PRACTITIONER

## 2019-04-12 PROCEDURE — G0378 HOSPITAL OBSERVATION PER HR: HCPCS

## 2019-04-12 PROCEDURE — 83735 ASSAY OF MAGNESIUM: CPT | Performed by: NURSE PRACTITIONER

## 2019-04-12 PROCEDURE — 25010000002 VANCOMYCIN 5 G RECONSTITUTED SOLUTION 5,000 MG VIAL

## 2019-04-12 PROCEDURE — 80048 BASIC METABOLIC PNL TOTAL CA: CPT | Performed by: NURSE PRACTITIONER

## 2019-04-12 PROCEDURE — 86140 C-REACTIVE PROTEIN: CPT | Performed by: NURSE PRACTITIONER

## 2019-04-12 RX ORDER — CEFDINIR 300 MG/1
300 CAPSULE ORAL 2 TIMES DAILY
Qty: 20 CAPSULE | Refills: 0 | Status: SHIPPED | OUTPATIENT
Start: 2019-04-12 | End: 2020-01-04

## 2019-04-12 RX ORDER — DOXYCYCLINE HYCLATE 100 MG
100 TABLET ORAL EVERY 12 HOURS
Qty: 20 TABLET | Refills: 0 | Status: SHIPPED | OUTPATIENT
Start: 2019-04-12 | End: 2020-01-04

## 2019-04-12 RX ADMIN — VANCOMYCIN HYDROCHLORIDE 1250 MG: 5 INJECTION, POWDER, LYOPHILIZED, FOR SOLUTION INTRAVENOUS at 04:06

## 2019-04-12 RX ADMIN — SODIUM CHLORIDE 100 ML/HR: 9 INJECTION, SOLUTION INTRAVENOUS at 06:09

## 2019-04-12 NOTE — PLAN OF CARE
Problem: Patient Care Overview  Goal: Plan of Care Review  Outcome: Ongoing (interventions implemented as appropriate)    Goal: Individualization and Mutuality  Outcome: Ongoing (interventions implemented as appropriate)    Goal: Discharge Needs Assessment  Outcome: Ongoing (interventions implemented as appropriate)    Goal: Interprofessional Rounds/Family Conf  Outcome: Ongoing (interventions implemented as appropriate)      Problem: Infection, Risk/Actual (Adult)  Goal: Identify Related Risk Factors and Signs and Symptoms  Outcome: Ongoing (interventions implemented as appropriate)    Goal: Infection Prevention/Resolution  Outcome: Ongoing (interventions implemented as appropriate)      Problem: Confusion, Chronic (Adult)  Goal: Identify Related Risk Factors and Signs and Symptoms  Outcome: Ongoing (interventions implemented as appropriate)    Goal: Cognitive/Functional Impairments Minimized  Outcome: Ongoing (interventions implemented as appropriate)    Goal: Free from Harm/Injuries  Outcome: Ongoing (interventions implemented as appropriate)

## 2019-04-12 NOTE — DISCHARGE SUMMARY
DISCHARGE SUMMARY        Patient Identification:  Name:  Debbie Strauss  Age:  53 y.o.  Sex:  female  :  1965  MRN:  [id number]  Visit Number:  [id number]    Date of Admission: 2019  Date of Discharge:      PCP: Hardeep Gómez MD    Discharging Provider: Juliet Henry MD      Discharge Diagnoses     Finger cellulitis  Dehydration      Consults/Procedures     Consults:   Consults     Date and Time Order Name Status Description    4/10/2019 0136 IP General Consult (Use specialty-specific consult if known)            Procedures Performed:         History of Presenting Illness     53-year-old female past medical history of arteriosclerosis, arthritis, asthma, carpal tunnel syndrome, chronic headaches, CAD, diabetes mellitus, fibromyalgia, gout, hypertension and stroke in .  Presents to the emergency room today complaining of right middle finger pain and arm pain.  Patient states that over the weekend she was working out in her yard but did not know that she had an injury or had had anything happen but states that she woke up this morning with redness noted to her middle finger on her right hand and as the day has progressed streaking has went up her arm extending to the elbow.  Patient denies any fevers at home.  Patient states she has had increased fatigue.  States she has laid in the bed all day long.  Has not ate or drink anything.  Patient does report nausea without vomiting or abdominal pain.  Denies chest pain, dizziness, or heart palpitations.  Denies any abnormal bleeding.     Workup in ED included CRP 1.54, lactate 1.0, white count within normal limits, blood cultures pending, x-ray pending.     Patient was admitted to the observation unit for further evaluation and monitoring.        Hospital Course     Patient was admitted to the Observation unit, was initiated on IV Vancomycin and Rocephin with almost complete resolution of the erythema.    We will discharge her on  oral Doxy and Omnicef with close follow up with Dr. Gómez. Patient might need to be initiated on oral Itraconazole with ortho referral if no resolution of finger blister in the next 5 days.      Discharge Vitals/Physical Examination     Vital Signs:  Temp:  [97.7 °F (36.5 °C)-98.6 °F (37 °C)] 97.7 °F (36.5 °C)  Heart Rate:  [72-86] 84  Resp:  [18-20] 20  BP: (100-121)/(52-73) 117/71  Mean Arterial Pressure (Non-Invasive) for the past 24 hrs (Last 3 readings):   Noninvasive MAP (mmHg)   04/12/19 0440 87   04/12/19 0100 74   04/11/19 1932 87     SpO2 Percentage    04/11/19 2100 04/12/19 0100 04/12/19 0440   SpO2: 97% 96% 98%     SpO2:  [96 %-99 %] 98 %  on   ;   Device (Oxygen Therapy): room air    Body mass index is 43.11 kg/m².  Wt Readings from Last 3 Encounters:   04/10/19 114 kg (251 lb 4.8 oz)   11/22/17 106 kg (234 lb)   02/27/17 106 kg (234 lb)         Physical Exam:    Constitutional:  Well-developed and well-nourished.  No respiratory distress.      HENT:  Head: Normocephalic and atraumatic.  Mouth:  Moist mucous membranes.    Eyes:  Conjunctivae and EOM are normal.  No scleral icterus.  Neck:  Neck supple.  No JVD present.    Cardiovascular:  Normal rate, regular rhythm and normal heart sounds with no murmur. No edema.  Pulmonary/Chest:  No respiratory distress, no wheezes, no crackles, with normal breath sounds and good air movement.  Abdominal:  Soft.  Bowel sounds are normal.  No distension and no tenderness.   Musculoskeletal:  No edema, no tenderness, and no deformity.  No swelling or redness of joints.  Neurological:  Alert and oriented to person, place, and time.  No facial droop.  No slurred speech.   Skin:  Resolved erythema with remaining small blisters to finger but non tender and not purulent.  Psychiatric:  Normal mood and affect.  Behavior is normal.      Pertinent Laboratory/Radiology Results     Pertinent Laboratory Results:              Results from last 7 days   Lab Units 04/12/19  9975  04/11/19  0314 04/10/19  0337 04/10/19  0018   CRP mg/dL 0.68* 1.06* 1.31* 1.54*   LACTATE mmol/L  --   --   --  1.0   WBC 10*3/mm3 5.54 5.03 6.22 5.94   HEMOGLOBIN g/dL 11.9* 12.7 12.2 13.4   HEMATOCRIT % 36.3 38.6 37.4 40.7   MCV fL 86.6 86.4 86.8 85.3   MCHC g/dL 32.8 32.9 32.6 32.9   PLATELETS 10*3/mm3 250 268 265 304     Results from last 7 days   Lab Units 04/12/19  0305 04/11/19  0314 04/10/19  0337 04/10/19  0018   SODIUM mmol/L 140 136 136 136   POTASSIUM mmol/L 3.8 4.1 4.1 4.2   MAGNESIUM mg/dL 1.9 2.0 1.9  --    CHLORIDE mmol/L 108* 101 102 98   CO2 mmol/L 19.9* 20.7* 20.8* 24.1   BUN mg/dL 14 18 21* 22*   CREATININE mg/dL 0.92 1.16* 1.03* 1.16*   EGFR IF NONAFRICN AM mL/min/1.73 63 49* 56* 49*   CALCIUM mg/dL 8.9 8.6 8.7 9.4   GLUCOSE mg/dL 104* 99 104* 142*   ALBUMIN g/dL  --   --   --  3.70   BILIRUBIN mg/dL  --   --   --  0.2   ALK PHOS U/L  --   --   --  60   AST (SGOT) U/L  --   --   --  18   ALT (SGPT) U/L  --   --   --  12   Estimated Creatinine Clearance: 85.5 mL/min (by C-G formula based on SCr of 0.92 mg/dL).  No results found for: AMMONIA    Glucose   Date/Time Value Ref Range Status   04/11/2019 2056 100 70 - 130 mg/dL Final   04/11/2019 1624 165 (H) 70 - 130 mg/dL Final   04/11/2019 1124 130 70 - 130 mg/dL Final   04/11/2019 0715 113 70 - 130 mg/dL Final   04/10/2019 1937 112 70 - 130 mg/dL Final   04/10/2019 1701 128 70 - 130 mg/dL Final   04/10/2019 1059 94 70 - 130 mg/dL Final   04/10/2019 0726 104 70 - 130 mg/dL Final     No results found for: HGBA1C  No results found for: TSH, FREET4    Blood Culture   Date Value Ref Range Status   04/10/2019 No growth at 2 days  Preliminary   04/10/2019 No growth at 2 days  Preliminary                 Pain Management Panel     There is no flowsheet data to display.          Pertinent Radiology Results:  Imaging Results (all)     Procedure Component Value Units Date/Time    XR Hand 2 View Right [[id number]] Collected:  04/10/19 0812     Updated:   04/10/19 0850    Narrative:       XR HAND 2 VIEW RIGHT-      CLINICAL INDICATION: Hand pain.        COMPARISON: 07/15/2015.     FINDINGS: 2 views of the right hand show no acute fracture or  dislocation. There are no blastic or lytic lesions.       Impression:       No acute bony abnormality.      This report was finalized on 4/10/2019 8:48 AM by Dr. Fran Morales MD.             Test Results Pending at Discharge:   Order Current Status    Blood Culture - Blood, Arm, Left Preliminary result    Blood Culture - Blood, Arm, Right Preliminary result          Discharge Disposition/Discharge Medications/Discharge Appointments     Discharge Disposition:   Home or Self Care    Condition at Discharge:  Stable, much improved with no issues today    Code Status While Inpatient:  Code Status and Medical Interventions:   Ordered at: 04/10/19 0206     Level Of Support Discussed With:    Patient     Code Status:    CPR     Medical Interventions (Level of Support Prior to Arrest):    Full       Discharge Medications:     Discharge Medications      New Medications      Instructions Start Date   cefdinir 300 MG capsule  Commonly known as:  OMNICEF   300 mg, Oral, 2 Times Daily      doxycycline 100 MG tablet  Commonly known as:  VIBRAMYICN   100 mg, Oral, Every 12 Hours         Continue These Medications      Instructions Start Date   albuterol sulfate  (90 Base) MCG/ACT inhaler  Commonly known as:  PROVENTIL HFA;VENTOLIN HFA;PROAIR HFA   2 puffs, Inhalation, Every 4 Hours PRN, Ventolin  (90 Base) MCG/ACT Inhalation Aerosol Solution; Patient Sig: Ventolin  (90 Base) MCG/ACT Inhalation Aerosol Solution ; 0; 19-Oct-2015; Active      amitriptyline 25 MG tablet  Commonly known as:  ELAVIL   25 mg, Oral, Nightly      aspirin 81 MG tablet   81 mg, Oral, Daily      cetirizine 10 MG tablet  Commonly known as:  zyrTEC   10 mg, Oral, Nightly      chlorthalidone 25 MG tablet  Commonly known as:  HYGROTON   12.5 mg, Oral,  Daily      cyclobenzaprine 10 MG tablet  Commonly known as:  FLEXERIL   10 mg, Oral, 3 Times Daily PRN      JANUVIA 100 MG tablet  Generic drug:  SITagliptin   100 mg, Oral, Daily      lisinopril 5 MG tablet  Commonly known as:  PRINIVIL,ZESTRIL   5 mg, Oral, 2 Times Daily      Magnesium 250 MG tablet   500 mg, Oral, Nightly      metFORMIN 850 MG tablet  Commonly known as:  GLUCOPHAGE   850 mg, Oral, 2 Times Daily With Meals      metoprolol tartrate 25 MG tablet  Commonly known as:  LOPRESSOR   12.5 mg, Oral, 2 Times Daily      multivitamin tablet tablet   1 tablet, Oral, Daily      pioglitazone 30 MG tablet  Commonly known as:  ACTOS   30 mg, Oral, Daily             Discharge Diet:      Discharge Activity:      Discharge Appointments:      Follow-up Information     Hardeep Gómez MD. Schedule an appointment as soon as possible for a visit in 5 day(s).    Specialty:  Family Medicine  Contact information:  [address] [address]  [phone]                           Juliet Henry MD  04/12/19  6:30 AM          Please note that this discharge summary required more than 30 minutes to complete.

## 2019-04-15 LAB
BACTERIA SPEC AEROBE CULT: NORMAL
BACTERIA SPEC AEROBE CULT: NORMAL

## 2019-11-11 ENCOUNTER — HOSPITAL ENCOUNTER (OUTPATIENT)
Dept: MAMMOGRAPHY | Facility: HOSPITAL | Age: 54
Discharge: HOME | End: 2019-11-11
Admitting: FAMILY MEDICINE

## 2019-11-11 DIAGNOSIS — Z12.31 SCREENING MAMMOGRAM FOR HIGH-RISK PATIENT: ICD-10-CM

## 2019-11-11 PROCEDURE — 77063 BREAST TOMOSYNTHESIS BI: CPT

## 2019-11-11 PROCEDURE — 77067 SCR MAMMO BI INCL CAD: CPT

## 2019-11-11 PROCEDURE — 77067 SCR MAMMO BI INCL CAD: CPT | Performed by: RADIOLOGY

## 2019-11-11 PROCEDURE — 77063 BREAST TOMOSYNTHESIS BI: CPT | Performed by: RADIOLOGY

## 2020-01-04 ENCOUNTER — HOSPITAL ENCOUNTER (EMERGENCY)
Facility: HOSPITAL | Age: 55
Discharge: HOME | End: 2020-01-04
Attending: EMERGENCY MEDICINE | Admitting: EMERGENCY MEDICINE

## 2020-01-04 VITALS
HEIGHT: 63 IN | TEMPERATURE: 97.4 F | DIASTOLIC BLOOD PRESSURE: 62 MMHG | SYSTOLIC BLOOD PRESSURE: 127 MMHG | HEART RATE: 71 BPM | OXYGEN SATURATION: 99 % | WEIGHT: 235 LBS | RESPIRATION RATE: 16 BRPM | BODY MASS INDEX: 41.64 KG/M2

## 2020-01-04 DIAGNOSIS — L03.113 CELLULITIS OF RIGHT ARM: Primary | ICD-10-CM

## 2020-01-04 LAB
ALBUMIN SERPL-MCNC: 3.7 G/DL (ref 3.5–5.2)
ALBUMIN/GLOB SERPL: 0.9 G/DL
ALP SERPL-CCNC: 57 U/L (ref 39–117)
ALT SERPL W P-5'-P-CCNC: 15 U/L (ref 1–33)
ANION GAP SERPL CALCULATED.3IONS-SCNC: 10.9 MMOL/L (ref 5–15)
AST SERPL-CCNC: 19 U/L (ref 1–32)
BASOPHILS # BLD AUTO: 0.03 10*3/MM3 (ref 0–0.2)
BASOPHILS NFR BLD AUTO: 0.5 % (ref 0–1.5)
BILIRUB SERPL-MCNC: 0.3 MG/DL (ref 0.2–1.2)
BUN BLD-MCNC: 19 MG/DL (ref 6–20)
BUN/CREAT SERPL: 15.4 (ref 7–25)
CALCIUM SPEC-SCNC: 9.1 MG/DL (ref 8.6–10.5)
CHLORIDE SERPL-SCNC: 100 MMOL/L (ref 98–107)
CO2 SERPL-SCNC: 26.1 MMOL/L (ref 22–29)
CREAT BLD-MCNC: 1.23 MG/DL (ref 0.57–1)
CRP SERPL-MCNC: 0.35 MG/DL (ref 0–0.5)
D-LACTATE SERPL-SCNC: 1.3 MMOL/L (ref 0.5–2)
DEPRECATED RDW RBC AUTO: 51.4 FL (ref 37–54)
EOSINOPHIL # BLD AUTO: 0.12 10*3/MM3 (ref 0–0.4)
EOSINOPHIL NFR BLD AUTO: 2.1 % (ref 0.3–6.2)
ERYTHROCYTE [DISTWIDTH] IN BLOOD BY AUTOMATED COUNT: 16.7 % (ref 12.3–15.4)
ERYTHROCYTE [SEDIMENTATION RATE] IN BLOOD: 35 MM/HR (ref 0–30)
GFR SERPL CREATININE-BSD FRML MDRD: 45 ML/MIN/1.73
GLOBULIN UR ELPH-MCNC: 3.9 GM/DL
GLUCOSE BLD-MCNC: 107 MG/DL (ref 65–99)
HCT VFR BLD AUTO: 39 % (ref 34–46.6)
HGB BLD-MCNC: 12.7 G/DL (ref 12–15.9)
IMM GRANULOCYTES # BLD AUTO: 0.02 10*3/MM3 (ref 0–0.05)
IMM GRANULOCYTES NFR BLD AUTO: 0.3 % (ref 0–0.5)
LYMPHOCYTES # BLD AUTO: 1.21 10*3/MM3 (ref 0.7–3.1)
LYMPHOCYTES NFR BLD AUTO: 20.8 % (ref 19.6–45.3)
MCH RBC QN AUTO: 27.7 PG (ref 26.6–33)
MCHC RBC AUTO-ENTMCNC: 32.6 G/DL (ref 31.5–35.7)
MCV RBC AUTO: 85.2 FL (ref 79–97)
MONOCYTES # BLD AUTO: 0.82 10*3/MM3 (ref 0.1–0.9)
MONOCYTES NFR BLD AUTO: 14.1 % (ref 5–12)
NEUTROPHILS # BLD AUTO: 3.62 10*3/MM3 (ref 1.7–7)
NEUTROPHILS NFR BLD AUTO: 62.2 % (ref 42.7–76)
NRBC BLD AUTO-RTO: 0 /100 WBC (ref 0–0.2)
PLATELET # BLD AUTO: 283 10*3/MM3 (ref 140–450)
PMV BLD AUTO: 10.2 FL (ref 6–12)
POTASSIUM BLD-SCNC: 4.2 MMOL/L (ref 3.5–5.2)
PROT SERPL-MCNC: 7.6 G/DL (ref 6–8.5)
RBC # BLD AUTO: 4.58 10*6/MM3 (ref 3.77–5.28)
SODIUM BLD-SCNC: 137 MMOL/L (ref 136–145)
WBC NRBC COR # BLD: 5.82 10*3/MM3 (ref 3.4–10.8)

## 2020-01-04 PROCEDURE — 85025 COMPLETE CBC W/AUTO DIFF WBC: CPT | Performed by: NURSE PRACTITIONER

## 2020-01-04 PROCEDURE — 96365 THER/PROPH/DIAG IV INF INIT: CPT

## 2020-01-04 PROCEDURE — 85652 RBC SED RATE AUTOMATED: CPT | Performed by: NURSE PRACTITIONER

## 2020-01-04 PROCEDURE — 87040 BLOOD CULTURE FOR BACTERIA: CPT | Performed by: NURSE PRACTITIONER

## 2020-01-04 PROCEDURE — 96367 TX/PROPH/DG ADDL SEQ IV INF: CPT

## 2020-01-04 PROCEDURE — 25010000002 VANCOMYCIN 5 G RECONSTITUTED SOLUTION 5,000 MG VIAL: Performed by: NURSE PRACTITIONER

## 2020-01-04 PROCEDURE — 96366 THER/PROPH/DIAG IV INF ADDON: CPT

## 2020-01-04 PROCEDURE — 25010000002 CEFTRIAXONE: Performed by: NURSE PRACTITIONER

## 2020-01-04 PROCEDURE — 83605 ASSAY OF LACTIC ACID: CPT | Performed by: NURSE PRACTITIONER

## 2020-01-04 PROCEDURE — 99282 EMERGENCY DEPT VISIT SF MDM: CPT

## 2020-01-04 PROCEDURE — 80053 COMPREHEN METABOLIC PANEL: CPT | Performed by: NURSE PRACTITIONER

## 2020-01-04 PROCEDURE — 86140 C-REACTIVE PROTEIN: CPT | Performed by: NURSE PRACTITIONER

## 2020-01-04 PROCEDURE — 36415 COLL VENOUS BLD VENIPUNCTURE: CPT

## 2020-01-04 RX ORDER — DOXYCYCLINE 100 MG/1
100 CAPSULE ORAL 2 TIMES DAILY
Qty: 20 CAPSULE | Refills: 0 | Status: SHIPPED | OUTPATIENT
Start: 2020-01-04

## 2020-01-04 RX ORDER — SODIUM CHLORIDE 0.9 % (FLUSH) 0.9 %
10 SYRINGE (ML) INJECTION AS NEEDED
Status: DISCONTINUED | OUTPATIENT
Start: 2020-01-04 | End: 2020-01-04 | Stop reason: HOSPADM

## 2020-01-04 RX ORDER — CEFDINIR 300 MG/1
300 CAPSULE ORAL 2 TIMES DAILY
Qty: 20 CAPSULE | Refills: 0 | Status: SHIPPED | OUTPATIENT
Start: 2020-01-04

## 2020-01-04 RX ADMIN — SODIUM CHLORIDE 1000 ML: 9 INJECTION, SOLUTION INTRAVENOUS at 13:02

## 2020-01-04 RX ADMIN — CEFTRIAXONE 1 G: 1 INJECTION, POWDER, FOR SOLUTION INTRAMUSCULAR; INTRAVENOUS at 16:02

## 2020-01-04 RX ADMIN — VANCOMYCIN HYDROCHLORIDE 2000 MG: 5 INJECTION, POWDER, LYOPHILIZED, FOR SOLUTION INTRAVENOUS at 13:02

## 2020-01-04 NOTE — ED PROVIDER NOTES
Subjective     History provided by:  Patient   used: No    Rash   Location:  Shoulder/arm  Shoulder/arm rash location:  R forearm and R hand  Quality: redness    Severity:  Moderate  Onset quality:  Sudden  Duration:  3 days  Timing:  Constant  Progression:  Worsening  Chronicity:  Recurrent  Context: not animal contact, not chemical exposure, not eggs, not food, not insect bite/sting, not medications, not new detergent/soap, not plant contact, not pregnancy and not sick contacts    Relieved by:  Nothing  Worsened by:  Nothing  Ineffective treatments:  None tried  Associated symptoms: no abdominal pain, no fever, no headaches, no induration, no myalgias, no nausea, no periorbital edema, no throat swelling, no URI, not vomiting and not wheezing        Review of Systems   Constitutional: Negative for fever.   Respiratory: Negative for wheezing.    Gastrointestinal: Negative for abdominal pain, nausea and vomiting.   Musculoskeletal: Negative for myalgias.   Skin: Positive for rash.   Neurological: Negative for headaches.       Past Medical History:   Diagnosis Date   • Arteriosclerosis    • Arthritis    • Asthma    • Carpal tunnel syndrome of left wrist    • Chronic headaches    • Coronary artery disease    • Diabetes mellitus (CMS/HCC)    • Diverticular disease    • Fibromyalgia    • Gout    • Hypertension    • Insomnia    • Migraines    • Stroke (CMS/HCC)            Allergies   Allergen Reactions   • Corticosteroids    • Prednisone Other (See Comments)       Past Surgical History:   Procedure Laterality Date   • ABDOMINAL SURGERY     • CARPAL TUNNEL RELEASE Right    •  SECTION     • COLONOSCOPY     • HYSTERECTOMY     • ULNAR NERVE DECOMPRESSION Left 2017    Procedure: ULNAR NERVE RELEASE, CARPAL TUNNEL RELEASE;  Surgeon: Wilmer Mayen MD;  Location: [facility];  Service:    • VOCAL CORD MEDIALIZATION WITH MONTOGOMERY THYROPLASTY         Family History   Problem Relation  Age of Onset   • Heart disease Mother    • Diabetes Mother    • Hypertension Mother    • Cancer Father    • Cancer Sister    • Diabetes Sister    • Hypertension Sister    • Hypertension Brother    • Breast cancer Neg Hx        Social History     Socioeconomic History   • Marital status:      Spouse name: Not on file   • Number of children: Not on file   • Years of education: Not on file   • Highest education level: Not on file   Tobacco Use   • Smoking status: Current Every Day Smoker     Packs/day: 0.25     Years: 30.00     Pack years: 7.50     Types: Cigarettes   Substance and Sexual Activity   • Alcohol use: No     Comment: FORMER   • Drug use: No   • Sexual activity: Defer           Objective   Physical Exam   Constitutional: She is oriented to person, place, and time. She appears well-developed and well-nourished.   HENT:   Head: Normocephalic.   Right Ear: External ear normal.   Left Ear: External ear normal.   Mouth/Throat: Oropharynx is clear and moist.   Eyes: Pupils are equal, round, and reactive to light. Conjunctivae and EOM are normal.   Neck: Normal range of motion. Neck supple.   Cardiovascular: Normal rate, regular rhythm, normal heart sounds and intact distal pulses.   Pulmonary/Chest: Effort normal and breath sounds normal.   Abdominal: Soft. Bowel sounds are normal.   Musculoskeletal: Normal range of motion.   Neurological: She is alert and oriented to person, place, and time.   Skin: Skin is warm and dry. Capillary refill takes less than 2 seconds.   Erythema right middle finger with erythema extending up right dorsum of hand up right forearm   Psychiatric: She has a normal mood and affect. Her behavior is normal. Thought content normal.   Nursing note and vitals reviewed.      Procedures           ED Course                                               MDM    Final diagnoses:   Cellulitis of right arm            Jim Golden, APRN  01/04/20 5943

## 2020-01-09 LAB
BACTERIA SPEC AEROBE CULT: NORMAL
BACTERIA SPEC AEROBE CULT: NORMAL

## 2020-01-22 ENCOUNTER — TRANSCRIBE ORDERS (OUTPATIENT)
Dept: ADMINISTRATIVE | Facility: HOSPITAL | Age: 55
End: 2020-01-22

## 2020-01-22 DIAGNOSIS — Z87.891 PERSONAL HISTORY OF TOBACCO USE, PRESENTING HAZARDS TO HEALTH: Primary | ICD-10-CM

## 2020-02-06 ENCOUNTER — APPOINTMENT (OUTPATIENT)
Dept: CT IMAGING | Facility: HOSPITAL | Age: 55
End: 2020-02-06

## 2022-09-14 ENCOUNTER — HOSPITAL ENCOUNTER (OUTPATIENT)
Dept: MAMMOGRAPHY | Facility: HOSPITAL | Age: 57
Discharge: HOME | End: 2022-09-14
Admitting: NURSE PRACTITIONER

## 2022-09-14 DIAGNOSIS — Z12.31 VISIT FOR SCREENING MAMMOGRAM: ICD-10-CM

## 2022-09-14 PROCEDURE — 77067 SCR MAMMO BI INCL CAD: CPT | Performed by: RADIOLOGY

## 2022-09-14 PROCEDURE — 77063 BREAST TOMOSYNTHESIS BI: CPT | Performed by: RADIOLOGY

## 2022-09-14 PROCEDURE — 77063 BREAST TOMOSYNTHESIS BI: CPT

## 2022-09-14 PROCEDURE — 77067 SCR MAMMO BI INCL CAD: CPT

## 2022-11-18 ENCOUNTER — HOSPITAL ENCOUNTER (EMERGENCY)
Facility: HOSPITAL | Age: 57
Discharge: HOME | End: 2022-11-18
Attending: STUDENT IN AN ORGANIZED HEALTH CARE EDUCATION/TRAINING PROGRAM | Admitting: STUDENT IN AN ORGANIZED HEALTH CARE EDUCATION/TRAINING PROGRAM

## 2022-11-18 ENCOUNTER — APPOINTMENT (OUTPATIENT)
Dept: GENERAL RADIOLOGY | Facility: HOSPITAL | Age: 57
End: 2022-11-18

## 2022-11-18 VITALS
HEIGHT: 63 IN | DIASTOLIC BLOOD PRESSURE: 71 MMHG | BODY MASS INDEX: 50.85 KG/M2 | RESPIRATION RATE: 18 BRPM | WEIGHT: 287 LBS | HEART RATE: 60 BPM | OXYGEN SATURATION: 99 % | TEMPERATURE: 97.5 F | SYSTOLIC BLOOD PRESSURE: 133 MMHG

## 2022-11-18 DIAGNOSIS — U07.1 COVID-19: Primary | ICD-10-CM

## 2022-11-18 DIAGNOSIS — B34.9 VIRAL ILLNESS: ICD-10-CM

## 2022-11-18 LAB
ALBUMIN SERPL-MCNC: 4.16 G/DL (ref 3.5–5.2)
ALBUMIN/GLOB SERPL: 1 G/DL
ALP SERPL-CCNC: 66 U/L (ref 39–117)
ALT SERPL W P-5'-P-CCNC: 31 U/L (ref 1–33)
ANION GAP SERPL CALCULATED.3IONS-SCNC: 13.8 MMOL/L (ref 5–15)
AST SERPL-CCNC: 24 U/L (ref 1–32)
BASOPHILS # BLD AUTO: 0.02 10*3/MM3 (ref 0–0.2)
BASOPHILS NFR BLD AUTO: 0.3 % (ref 0–1.5)
BILIRUB SERPL-MCNC: 0.3 MG/DL (ref 0–1.2)
BUN SERPL-MCNC: 22 MG/DL (ref 6–20)
BUN/CREAT SERPL: 18 (ref 7–25)
CALCIUM SPEC-SCNC: 9.6 MG/DL (ref 8.6–10.5)
CHLORIDE SERPL-SCNC: 95 MMOL/L (ref 98–107)
CO2 SERPL-SCNC: 24.2 MMOL/L (ref 22–29)
CREAT SERPL-MCNC: 1.22 MG/DL (ref 0.57–1)
CRP SERPL-MCNC: 0.49 MG/DL (ref 0–0.5)
D-LACTATE SERPL-SCNC: 1.7 MMOL/L (ref 0.5–2)
DEPRECATED RDW RBC AUTO: 50.8 FL (ref 37–54)
EGFRCR SERPLBLD CKD-EPI 2021: 51.2 ML/MIN/1.73
EOSINOPHIL # BLD AUTO: 0.04 10*3/MM3 (ref 0–0.4)
EOSINOPHIL NFR BLD AUTO: 0.7 % (ref 0.3–6.2)
ERYTHROCYTE [DISTWIDTH] IN BLOOD BY AUTOMATED COUNT: 16.2 % (ref 12.3–15.4)
FLUAV RNA RESP QL NAA+PROBE: NOT DETECTED
FLUBV RNA RESP QL NAA+PROBE: NOT DETECTED
GLOBULIN UR ELPH-MCNC: 4 GM/DL
GLUCOSE SERPL-MCNC: 171 MG/DL (ref 65–99)
HCT VFR BLD AUTO: 40.8 % (ref 34–46.6)
HGB BLD-MCNC: 12.9 G/DL (ref 12–15.9)
HOLD SPECIMEN: NORMAL
HOLD SPECIMEN: NORMAL
IMM GRANULOCYTES # BLD AUTO: 0.03 10*3/MM3 (ref 0–0.05)
IMM GRANULOCYTES NFR BLD AUTO: 0.5 % (ref 0–0.5)
LIPASE SERPL-CCNC: 44 U/L (ref 13–60)
LYMPHOCYTES # BLD AUTO: 1.51 10*3/MM3 (ref 0.7–3.1)
LYMPHOCYTES NFR BLD AUTO: 25.7 % (ref 19.6–45.3)
MAGNESIUM SERPL-MCNC: 1.8 MG/DL (ref 1.6–2.6)
MCH RBC QN AUTO: 27.2 PG (ref 26.6–33)
MCHC RBC AUTO-ENTMCNC: 31.6 G/DL (ref 31.5–35.7)
MCV RBC AUTO: 85.9 FL (ref 79–97)
MONOCYTES # BLD AUTO: 0.62 10*3/MM3 (ref 0.1–0.9)
MONOCYTES NFR BLD AUTO: 10.5 % (ref 5–12)
NEUTROPHILS NFR BLD AUTO: 3.66 10*3/MM3 (ref 1.7–7)
NEUTROPHILS NFR BLD AUTO: 62.3 % (ref 42.7–76)
NRBC BLD AUTO-RTO: 0 /100 WBC (ref 0–0.2)
NT-PROBNP SERPL-MCNC: 72.2 PG/ML (ref 0–900)
PLATELET # BLD AUTO: 233 10*3/MM3 (ref 140–450)
PMV BLD AUTO: 10.7 FL (ref 6–12)
POTASSIUM SERPL-SCNC: 4.7 MMOL/L (ref 3.5–5.2)
PROCALCITONIN SERPL-MCNC: 0.05 NG/ML (ref 0–0.25)
PROT SERPL-MCNC: 8.2 G/DL (ref 6–8.5)
QT INTERVAL: 428 MS
QTC INTERVAL: 441 MS
RBC # BLD AUTO: 4.75 10*6/MM3 (ref 3.77–5.28)
SARS-COV-2 RNA RESP QL NAA+PROBE: DETECTED
SODIUM SERPL-SCNC: 133 MMOL/L (ref 136–145)
TROPONIN T SERPL-MCNC: <0.01 NG/ML (ref 0–0.03)
WBC NRBC COR # BLD: 5.88 10*3/MM3 (ref 3.4–10.8)
WHOLE BLOOD HOLD COAG: NORMAL
WHOLE BLOOD HOLD SPECIMEN: NORMAL

## 2022-11-18 PROCEDURE — 71045 X-RAY EXAM CHEST 1 VIEW: CPT

## 2022-11-18 PROCEDURE — 85025 COMPLETE CBC W/AUTO DIFF WBC: CPT | Performed by: PHYSICIAN ASSISTANT

## 2022-11-18 PROCEDURE — 93005 ELECTROCARDIOGRAM TRACING: CPT | Performed by: STUDENT IN AN ORGANIZED HEALTH CARE EDUCATION/TRAINING PROGRAM

## 2022-11-18 PROCEDURE — 36415 COLL VENOUS BLD VENIPUNCTURE: CPT

## 2022-11-18 PROCEDURE — 87040 BLOOD CULTURE FOR BACTERIA: CPT | Performed by: PHYSICIAN ASSISTANT

## 2022-11-18 PROCEDURE — 93005 ELECTROCARDIOGRAM TRACING: CPT | Performed by: EMERGENCY MEDICINE

## 2022-11-18 PROCEDURE — 99283 EMERGENCY DEPT VISIT LOW MDM: CPT

## 2022-11-18 PROCEDURE — 84484 ASSAY OF TROPONIN QUANT: CPT | Performed by: PHYSICIAN ASSISTANT

## 2022-11-18 PROCEDURE — 83880 ASSAY OF NATRIURETIC PEPTIDE: CPT | Performed by: PHYSICIAN ASSISTANT

## 2022-11-18 PROCEDURE — 83690 ASSAY OF LIPASE: CPT | Performed by: PHYSICIAN ASSISTANT

## 2022-11-18 PROCEDURE — 93010 ELECTROCARDIOGRAM REPORT: CPT | Performed by: INTERNAL MEDICINE

## 2022-11-18 PROCEDURE — 83605 ASSAY OF LACTIC ACID: CPT | Performed by: PHYSICIAN ASSISTANT

## 2022-11-18 PROCEDURE — 84145 PROCALCITONIN (PCT): CPT | Performed by: PHYSICIAN ASSISTANT

## 2022-11-18 PROCEDURE — 83735 ASSAY OF MAGNESIUM: CPT | Performed by: PHYSICIAN ASSISTANT

## 2022-11-18 PROCEDURE — 80053 COMPREHEN METABOLIC PANEL: CPT | Performed by: PHYSICIAN ASSISTANT

## 2022-11-18 PROCEDURE — 87636 SARSCOV2 & INF A&B AMP PRB: CPT | Performed by: PHYSICIAN ASSISTANT

## 2022-11-18 PROCEDURE — 86140 C-REACTIVE PROTEIN: CPT | Performed by: PHYSICIAN ASSISTANT

## 2022-11-18 RX ORDER — ONDANSETRON 4 MG/1
4 TABLET, ORALLY DISINTEGRATING ORAL EVERY 8 HOURS PRN
Qty: 15 TABLET | Refills: 0 | Status: SHIPPED | OUTPATIENT
Start: 2022-11-18

## 2022-11-18 NOTE — ED NOTES
MEDICAL SCREENING:    Reason for Visit: patient reports being ill for the last 5 days - fever, diarrhea, nausea, shortness of breath    Patient initially seen in triage.  The patient was advised further evaluation and diagnostic testing will be needed, some of the treatment and testing will be initiated in the lobby in order to begin the process.  The patient will be returned to the waiting area for the time being and possibly be re-assessed by a subsequent ED provider.  The patient will be brought back to the treatment area in as timely manner as possible.       Nguyen Flores, PA  11/18/22 1832

## 2022-11-19 NOTE — ED PROVIDER NOTES
[facility]  emergency department encounter        Pt Name: Debbie Strauss  MRN: [id number]  Birthdate 1965  Date of evaluation: 2022    Chief Complaint   Patient presents with   • Shortness of Breath   • Diarrhea             HISTORY OF PRESENT ILLNESS:   Debbie Strauss is a 57 y.o. female PMH HTN, HLD, DM, morbid obesity, CVA, gout, CAD, atherosclerosis.    Patient presents for 6 days of fever cough congestion shortness of breath nausea diarrhea.  Symptoms constant, progressively worsening.  Shortness of breath has occurred at rest and with ambulation.  Denies chest pain abdominal pain vomiting.    Has not received COVID vaccinations, Has not had COVID.      No other exacerbating or alleviating factors other than as noted above.  Severity: Severe    PCP: Hardeep Gómez MD          REVIEW OF SYSTEMS:     Review of Systems   Constitutional: Positive for fever.   HENT: Positive for congestion and rhinorrhea.    Eyes: Negative for visual disturbance.   Respiratory: Positive for cough and shortness of breath.    Cardiovascular: Negative for chest pain.   Gastrointestinal: Positive for diarrhea and nausea. Negative for abdominal pain and vomiting.   Genitourinary: Negative for dysuria.   Musculoskeletal: Negative for myalgias.   Skin: Negative for rash.   Neurological: Negative for headaches.   Psychiatric/Behavioral: Negative for confusion.       Review of systems otherwise as per HPI.          PREVIOUS HISTORY:         Past Medical History:   Diagnosis Date   • Arteriosclerosis    • Arthritis    • Asthma    • Carpal tunnel syndrome of left wrist    • Chronic headaches    • Coronary artery disease    • Diabetes mellitus (HCC)    • Diverticular disease    • Fibromyalgia    • Gout    • Hypertension    • Insomnia    • Migraines    • Stroke (HCC)                Past Surgical History:   Procedure Laterality Date   • ABDOMINAL SURGERY     • CARPAL TUNNEL RELEASE Right    •   SECTION     • COLONOSCOPY     • HYSTERECTOMY     • ULNAR NERVE DECOMPRESSION Left 1/5/2017    Procedure: ULNAR NERVE RELEASE, CARPAL TUNNEL RELEASE;  Surgeon: Wilmer Mayen MD;  Location: [facility];  Service:    • VOCAL CORD MEDIALIZATION WITH MONTOGOMERY THYROPLASTY             Social History     Socioeconomic History   • Marital status:    Tobacco Use   • Smoking status: Every Day     Packs/day: 0.25     Years: 30.00     Pack years: 7.50     Types: Cigarettes   Substance and Sexual Activity   • Alcohol use: No     Comment: FORMER   • Drug use: No   • Sexual activity: Defer           Family History   Problem Relation Age of Onset   • Heart disease Mother    • Diabetes Mother    • Hypertension Mother    • Cancer Father    • Cancer Sister    • Diabetes Sister    • Hypertension Sister    • Hypertension Brother    • Breast cancer Neg Hx          Current Outpatient Medications   Medication Instructions   • albuterol (PROVENTIL HFA;VENTOLIN HFA) 108 (90 BASE) MCG/ACT inhaler 2 puffs, Inhalation, Every 4 Hours PRN, Ventolin  (90 Base) MCG/ACT Inhalation Aerosol Solution; Patient Sig: Ventolin  (90 Base) MCG/ACT Inhalation Aerosol Solution ; 0; 19-Oct-2015; Active   • amitriptyline (ELAVIL) 25 mg, Oral, Nightly   • aspirin 81 mg, Oral, Daily   • cefdinir (OMNICEF) 300 mg, Oral, 2 Times Daily   • cetirizine (ZYRTEC) 10 mg, Oral, Nightly   • chlorthalidone (HYGROTON) 12.5 mg, Oral, Daily   • cyclobenzaprine (FLEXERIL) 10 mg, Oral, 3 Times Daily PRN   • doxycycline (MONODOX) 100 mg, Oral, 2 Times Daily   • Januvia 100 mg, Oral, Daily   • lisinopril (PRINIVIL,ZESTRIL) 5 mg, Oral, 2 Times Daily   • Magnesium 500 mg, Oral, Nightly   • metFORMIN (GLUCOPHAGE) 850 mg, Oral, 2 Times Daily With Meals   • metoprolol tartrate (LOPRESSOR) 12.5 mg, Oral, 2 Times Daily   • multivitamin (DAILY RAF) tablet tablet 1 tablet, Oral, Daily   • ondansetron ODT (ZOFRAN-ODT) 4 mg, Translingual, Every 8 Hours PRN   •  "pioglitazone (ACTOS) 30 mg, Oral, Daily         Allergies:  Corticosteroids and Prednisone    Medications, allergies and past medical, surgical, family, and social history reviewed.        PHYSICAL EXAM:     Patient Vitals for the past 24 hrs:   BP Temp Temp src Pulse Resp SpO2 Height Weight   11/18/22 2118 133/71 -- -- 60 18 99 % -- --   11/18/22 1818 (!) 181/86 97.5 °F (36.4 °C) Oral 62 18 100 % 160 cm (63\") 130 kg (287 lb)       Physical Exam  Vitals and nursing note reviewed.   Constitutional:       General: She is not in acute distress.     Appearance: She is obese. She is not ill-appearing.   HENT:      Head: Normocephalic and atraumatic.   Eyes:      Extraocular Movements: Extraocular movements intact.      Conjunctiva/sclera: Conjunctivae normal.   Cardiovascular:      Rate and Rhythm: Normal rate and regular rhythm.   Pulmonary:      Effort: Pulmonary effort is normal. No respiratory distress.   Abdominal:      General: Abdomen is flat. There is no distension.   Musculoskeletal:         General: No deformity. Normal range of motion.      Cervical back: Normal range of motion. No rigidity.   Skin:     Coloration: Skin is not jaundiced.      Findings: No rash.   Neurological:      General: No focal deficit present.      Mental Status: She is alert and oriented to person, place, and time.   Psychiatric:         Mood and Affect: Mood normal.         Behavior: Behavior normal.             COMPLETED DIAGNOSTIC STUDIES AND INTERVENTIONS:     Lab Results (last 24 hours)     Procedure Component Value Units Date/Time    CBC & Differential [[id number]]  (Abnormal) Collected: 11/18/22 1919    Specimen: Blood Updated: 11/18/22 2017    Narrative:      The following orders were created for panel order CBC & Differential.  Procedure                               Abnormality         Status                     ---------                               -----------         ------                     CBC Auto " Differential[[id number]]        Abnormal            Final result                 Please view results for these tests on the individual orders.    Comprehensive Metabolic Panel [[id number]]  (Abnormal) Collected: 11/18/22 1919    Specimen: Blood Updated: 11/18/22 2004     Glucose 171 mg/dL      BUN 22 mg/dL      Creatinine 1.22 mg/dL      Sodium 133 mmol/L      Potassium 4.7 mmol/L      Chloride 95 mmol/L      CO2 24.2 mmol/L      Calcium 9.6 mg/dL      Total Protein 8.2 g/dL      Albumin 4.16 g/dL      ALT (SGPT) 31 U/L      AST (SGOT) 24 U/L      Alkaline Phosphatase 66 U/L      Total Bilirubin 0.3 mg/dL      Globulin 4.0 gm/dL      A/G Ratio 1.0 g/dL      BUN/Creatinine Ratio 18.0     Anion Gap 13.8 mmol/L      eGFR 51.2 mL/min/1.73      Comment: National Kidney Foundation and American Society of Nephrology (ASN) Task Force recommended calculation based on the Chronic Kidney Disease Epidemiology Collaboration (CKD-EPI) equation refit without adjustment for race.       Narrative:      GFR Normal >60  Chronic Kidney Disease <60  Kidney Failure <15      Lipase [[id number]]  (Normal) Collected: 11/18/22 1919    Specimen: Blood Updated: 11/18/22 2004     Lipase 44 U/L     BNP [[id number]]  (Normal) Collected: 11/18/22 1919    Specimen: Blood Updated: 11/18/22 2000     proBNP 72.2 pg/mL     Narrative:      Among patients with dyspnea, NT-proBNP is highly sensitive for the detection of acute congestive heart failure. In addition NT-proBNP of <300 pg/ml effectively rules out acute congestive heart failure with 99% negative predictive value.      Troponin [[id number]]  (Normal) Collected: 11/18/22 1919    Specimen: Blood Updated: 11/18/22 2004     Troponin T <0.010 ng/mL     Narrative:      Troponin T Reference Range:  <= 0.03 ng/mL-   Negative for AMI  >0.03 ng/mL-     Abnormal for myocardial necrosis.  Clinicians would have to utilize clinical acumen, EKG, Troponin and serial changes to determine if it is an Acute Myocardial  "Infarction or myocardial injury due to an underlying chronic condition.       Results may be falsely decreased if patient taking Biotin.      Blood Culture - Blood, Arm, Right [[id number]] Collected: 11/18/22 1919    Specimen: Blood from Arm, Right Updated: 11/18/22 1928    Lactic Acid, Plasma [[id number]]  (Normal) Collected: 11/18/22 1919    Specimen: Blood Updated: 11/18/22 2000     Lactate 1.7 mmol/L     Procalcitonin [[id number]]  (Normal) Collected: 11/18/22 1919    Specimen: Blood Updated: 11/18/22 2010     Procalcitonin 0.05 ng/mL     Narrative:      As a Marker for Sepsis (Non-Neonates):    1. <0.5 ng/mL represents a low risk of severe sepsis and/or septic shock.  2. >2 ng/mL represents a high risk of severe sepsis and/or septic shock.    As a Marker for Lower Respiratory Tract Infections that require antibiotic therapy:    PCT on Admission    Antibiotic Therapy       6-12 Hrs later    >0.5                Strongly Recommended  >0.25 - <0.5        Recommended   0.1 - 0.25          Discouraged              Remeasure/reassess PCT  <0.1                Strongly Discouraged     Remeasure/reassess PCT    As 28 day mortality risk marker: \"Change in Procalcitonin Result\" (>80% or <=80%) if Day 0 (or Day 1) and Day 4 values are available. Refer to http://www.[employer]-pct-calculator.com    Change in PCT <=80%  A decrease of PCT levels below or equal to 80% defines a positive change in PCT test result representing a higher risk for 28-day all-cause mortality of patients diagnosed with severe sepsis for septic shock.    Change in PCT >80%  A decrease of PCT levels of more than 80% defines a negative change in PCT result representing a lower risk for 28-day all-cause mortality of patients diagnosed with severe sepsis or septic shock.       C-reactive Protein [[id number]]  (Normal) Collected: 11/18/22 1919    Specimen: Blood Updated: 11/18/22 2004     C-Reactive Protein 0.49 mg/dL     Magnesium [[id number]]  (Normal) Collected: " 11/18/22 1919    Specimen: Blood Updated: 11/18/22 2004     Magnesium 1.8 mg/dL     CBC Auto Differential [[id number]]  (Abnormal) Collected: 11/18/22 1919    Specimen: Blood Updated: 11/18/22 2017     WBC 5.88 10*3/mm3      RBC 4.75 10*6/mm3      Hemoglobin 12.9 g/dL      Hematocrit 40.8 %      MCV 85.9 fL      MCH 27.2 pg      MCHC 31.6 g/dL      RDW 16.2 %      RDW-SD 50.8 fl      MPV 10.7 fL      Platelets 233 10*3/mm3      Neutrophil % 62.3 %      Lymphocyte % 25.7 %      Monocyte % 10.5 %      Eosinophil % 0.7 %      Basophil % 0.3 %      Immature Grans % 0.5 %      Neutrophils, Absolute 3.66 10*3/mm3      Lymphocytes, Absolute 1.51 10*3/mm3      Monocytes, Absolute 0.62 10*3/mm3      Eosinophils, Absolute 0.04 10*3/mm3      Basophils, Absolute 0.02 10*3/mm3      Immature Grans, Absolute 0.03 10*3/mm3      nRBC 0.0 /100 WBC     COVID-19 and FLU A/B PCR - Swab, Nasopharynx [[id number]]  (Abnormal) Collected: 11/18/22 1920    Specimen: Swab from Nasopharynx Updated: 11/18/22 2005     COVID19 Detected     Influenza A PCR Not Detected     Influenza B PCR Not Detected    Narrative:      Fact sheet for providers: https://www.fda.gov/media/442394/download    Fact sheet for patients: https://www.fda.gov/media/734485/download    Test performed by PCR.  Influenza A and Influenza B negative results should be considered presumptive in samples that have a positive SARS-CoV-2 result.    Competitive inhibition studies showed that SARS-CoV-2 virus, when present at concentrations above 3.6E+04 copies/mL, can inhibit the detection and amplification of influenza A and influenza B virus RNA if present at or below 1.8E+02 copies/mL or 4.9E+02 copies/mL, respectively, and may lead to false negative influenza virus results. If co-infection with influenza A or influenza B virus is suspected in samples with a positive SARS-CoV-2 result, the sample should be re-tested with another FDA cleared, approved, or authorized influenza test, if  influenza virus detection would change clinical management.            XR Chest 1 View   Final Result   No acute cardiopulmonary findings.      Signer Name: Alli Torres MD    Signed: 11/18/2022 7:41 PM    Workstation Name: [facility]     [facility]            New Medications Ordered This Visit   Medications   • ondansetron ODT (ZOFRAN-ODT) 4 MG disintegrating tablet     Sig: Place 1 tablet on the tongue Every 8 (Eight) Hours As Needed for Nausea or Vomiting for up to 15 doses.     Dispense:  15 tablet     Refill:  0         Procedures            MEDICAL DECISION-MAKING AND ED COURSE:     ED Course as of 11/18/22 2211 Fri Nov 18, 2022 2154 XR Chest 1 View  No acute [KP]   2155 EKG at 1820 5 PM, sinus rhythm, 64 bpm, QRS 84, QTc 441, left axis deviation, poor R wave progression, no STEMI. [KP]   2155 Troponin T: <0.010 [KP]   2155 Lipase: 44 [KP]   2155 C-Reactive Protein: 0.49 [KP]   2155 Magnesium: 1.8 [KP]   2155 Procalcitonin: 0.05 [KP]   2155 COVID19(!!): Detected [KP]   2155 Influenza A PCR: Not Detected [KP]   2155 Influenza B PCR: Not Detected [KP]   2155 WBC: 5.88 [KP]   2155 Hemoglobin: 12.9 [KP]   2155 Lactate: 1.7 [KP]   2155 proBNP: 72.2 [KP]   2156 Creatinine(!): 1.22  Same as 2 years ago [KP]   2208 57-year-old female with 6 days of viral syndrome, COVID positive.  Discussed risk benefits of bebtelovimab, patient declined.  Out of window for Paxlovid.  No active nausea at this time.  Ambulated patient around room for multiple minutes, no hypoxia, oxygen saturation remained 100% room air.  Will discharge with Zofran, recommend follow-up primary care provider when able, return here for any new onset or worsening symptoms. [KP]      ED Course User Index  [KP] Allan Roman MD       ?      FINAL IMPRESSION:       1. COVID-19    2. Viral illness         The complaints listed here are new problems to this examiner.      FOLLOW-UP  Hardeep Gómez MD  [address] [facility]  [address]  [facility] [address]  [phone]    Schedule an appointment as soon as possible for a visit       [facility] Emergency Department  [address] [place] [id number] [phone]    If symptoms worsen      DISPOSITION  ED Disposition     ED Disposition   Discharge    Condition   Stable    Comment   --                 This note was dictated using a [id number] tool. Occasional wrong-word or 'sound-a-like' substitutions may have occurred due to the inherent limitations of voice recognition software. ?Read the chart carefully and recognize, using context, where substitutions have occurred.    Allan Roman MD  22:11 EST  11/18/2022             Allan Roman MD  11/18/22 2218

## 2022-11-23 LAB — BACTERIA SPEC AEROBE CULT: NORMAL

## 2024-03-27 ENCOUNTER — TRANSCRIBE ORDERS (OUTPATIENT)
Dept: ADMINISTRATIVE | Facility: HOSPITAL | Age: 59
End: 2024-03-27
Payer: MEDICARE

## 2024-03-27 DIAGNOSIS — Z12.31 VISIT FOR SCREENING MAMMOGRAM: Primary | ICD-10-CM

## 2024-04-30 ENCOUNTER — HOSPITAL ENCOUNTER (OUTPATIENT)
Dept: MAMMOGRAPHY | Facility: HOSPITAL | Age: 59
Discharge: HOME | End: 2024-04-30
Admitting: NURSE PRACTITIONER
Payer: MEDICARE

## 2024-04-30 DIAGNOSIS — Z12.31 VISIT FOR SCREENING MAMMOGRAM: ICD-10-CM

## 2024-04-30 PROCEDURE — 77063 BREAST TOMOSYNTHESIS BI: CPT

## 2024-04-30 PROCEDURE — 77063 BREAST TOMOSYNTHESIS BI: CPT | Performed by: RADIOLOGY

## 2024-04-30 PROCEDURE — 77067 SCR MAMMO BI INCL CAD: CPT | Performed by: RADIOLOGY

## 2024-04-30 PROCEDURE — 77067 SCR MAMMO BI INCL CAD: CPT
